# Patient Record
Sex: MALE | Race: WHITE | Employment: OTHER | URBAN - METROPOLITAN AREA
[De-identification: names, ages, dates, MRNs, and addresses within clinical notes are randomized per-mention and may not be internally consistent; named-entity substitution may affect disease eponyms.]

---

## 2017-03-08 ENCOUNTER — HOSPITAL ENCOUNTER (OUTPATIENT)
Dept: CT IMAGING | Age: 67
Discharge: HOME | End: 2017-03-08
Attending: INTERNAL MEDICINE
Payer: MEDICARE

## 2017-03-08 DIAGNOSIS — R13.10 PROBLEMS WITH SWALLOWING AND MASTICATION: ICD-10-CM

## 2017-03-08 DIAGNOSIS — K44.9 DIAPHRAGMATIC HERNIA WITHOUT MENTION OF OBSTRUCTION OR GANGRENE: ICD-10-CM

## 2017-03-08 DIAGNOSIS — R07.9 CHEST PAIN, UNSPECIFIED: ICD-10-CM

## 2017-03-08 DIAGNOSIS — K21.9 ESOPHAGEAL REFLUX: ICD-10-CM

## 2017-03-08 DIAGNOSIS — K90.0 CELIAC DISEASE: ICD-10-CM

## 2017-03-08 LAB — CREAT BLD-MCNC: 1 MG/DL (ref 0.6–1.3)

## 2017-03-08 PROCEDURE — 74011250636 HC RX REV CODE- 250/636: Performed by: INTERNAL MEDICINE

## 2017-03-08 PROCEDURE — 82565 ASSAY OF CREATININE: CPT

## 2017-03-08 PROCEDURE — 74011636320 HC RX REV CODE- 636/320: Performed by: INTERNAL MEDICINE

## 2017-03-08 PROCEDURE — 71260 CT THORAX DX C+: CPT

## 2017-03-08 RX ORDER — SODIUM CHLORIDE 0.9 % (FLUSH) 0.9 %
10 SYRINGE (ML) INJECTION
Status: COMPLETED | OUTPATIENT
Start: 2017-03-08 | End: 2017-03-08

## 2017-03-08 RX ORDER — SODIUM CHLORIDE 9 MG/ML
50 INJECTION, SOLUTION INTRAVENOUS
Status: COMPLETED | OUTPATIENT
Start: 2017-03-08 | End: 2017-03-08

## 2017-03-08 RX ADMIN — IOPAMIDOL 80 ML: 612 INJECTION, SOLUTION INTRAVENOUS at 07:49

## 2017-03-08 RX ADMIN — SODIUM CHLORIDE 50 ML/HR: 900 INJECTION, SOLUTION INTRAVENOUS at 07:50

## 2017-03-08 RX ADMIN — Medication 10 ML: at 07:50

## 2017-03-23 ENCOUNTER — OFFICE VISIT (OUTPATIENT)
Dept: CARDIOLOGY CLINIC | Age: 67
End: 2017-03-23

## 2017-03-23 VITALS
WEIGHT: 226 LBS | SYSTOLIC BLOOD PRESSURE: 130 MMHG | BODY MASS INDEX: 33.47 KG/M2 | HEIGHT: 69 IN | DIASTOLIC BLOOD PRESSURE: 90 MMHG | OXYGEN SATURATION: 95 % | RESPIRATION RATE: 16 BRPM | HEART RATE: 80 BPM

## 2017-03-23 DIAGNOSIS — I10 ESSENTIAL HYPERTENSION: Primary | ICD-10-CM

## 2017-03-23 DIAGNOSIS — I20.9 ANGINA PECTORIS (HCC): ICD-10-CM

## 2017-03-23 NOTE — MR AVS SNAPSHOT
Visit Information Date & Time Provider Department Dept. Phone Encounter #  
 3/23/2017 12:45 PM Shyam Brown, [address] [facility] [phone] Your Appointments 3/29/2017 10:00 AM  
ECHO CARDIOGRAMS 2D with [address] [facility]) Appt Note: echo per Dr. Renee Al, $15.00 cc, jr 3/23/17  
 [address]  
[phone] [address] [place] [address] [phone] [id number]  
  
    
 3/31/2017  9:00 AM  
STRESS ECHOCARDIOGRAMS with STRES[facility], [facility]) Appt Note: per Dr. Renee Al, $15.00 cc, jr 3/23/17  
 [address]  
[phone] [address] Upcoming Health Maintenance Date Due Hepatitis C Screening 1950 DTaP/Tdap/Td series (1 - Tdap) 7/25/1971 FOBT Q 1 YEAR AGE 50-75 7/25/2000 ZOSTER VACCINE AGE 60> 7/25/2010 GLAUCOMA SCREENING Q2Y 7/25/2015 Pneumococcal 65+ Low/Medium Risk (1 of 2 - PCV13) 7/25/2015 MEDICARE YEARLY EXAM 7/25/2015 INFLUENZA AGE 9 TO ADULT 8/1/2016 Allergies as of 3/23/2017  Review Complete On: 3/23/2017 By: Shyam Brown MD  
  
 Severity Noted Reaction Type Reactions Other Food  03/23/2017    Other (comments) Rye due to celiac Barley  03/23/2017    Other (comments) Celiac Wheat  03/23/2017    Other (comments) Celiac Current Immunizations  Never Reviewed No immunizations on file. Not reviewed this visit You Were Diagnosed With   
  
 Codes Comments Essential hypertension    -  Primary ICD-10-CM: I10 
ICD-9-CM: 401.9 Angina pectoris ([facility] [address]     ICD-10-CM: I20.9 ICD-9-CM: 413.9 Vitals  BP Pulse Resp Height(growth percentile) Weight(growth percentile) SpO2  
 130/90 (BP 1 Location: Right arm, BP Patient Position: Sitting) 80 16 5' 9\" (1.753 m) 226 lb (102.5 kg) 95% BMI Smoking Status 33.37 kg/m2 Never Smoker Vitals History BMI and BSA Data Body Mass Index Body Surface Area  
 33.37 kg/m 2 2.23 m 2 Preferred Pharmacy Pharmacy Name Phone [facility] [phone] Your Updated Medication List  
  
   
This list is accurate as of: 3/23/17 12:58 PM.  Always use your most recent med list.  
  
  
  
  
 ACIPHEX 20 mg tablet Generic drug:  RABEprazole Take 20 mg by mouth every morning. ADVAIR DISKUS 250-50 mcg/dose diskus inhaler Generic drug:  fluticasone-salmeterol Take 1 Puff by inhalation daily. losartan 50 mg tablet Commonly known as:  COZAAR Take 50 mg by mouth every morning. mometasone 50 mcg/actuation nasal spray Commonly known as:  NASONEX  
2 Sprays by Both Nostrils route daily. multivitamin tablet Commonly known as:  ONE A DAY Take 1 Tab by mouth daily. NORVASC 5 mg tablet Generic drug:  amLODIPine Take 5 mg by mouth every morning. Indications: HYPERTENSION  
  
 oxyCODONE IR 5 mg immediate release tablet Commonly known as:  [place]ridge Ditto Take 1-2 Tabs by mouth every three (3) hours as needed. Max Daily Amount: 80 mg. SALINE MIST NA  
by Nasal route daily. This is a medi pot usage  
  
 warfarin 2 mg tablet Commonly known as:  COUMADIN Begin on 6/16 at 6pm as instructed by Dr. Younger' office. Your INR will be drawn on Mondays and Thursdays. Your dose may need to be adjusted and direction will be given to you each Tuesday and Friday by Dr. Younger' office. ZANTAC PO Take  by mouth. We Performed the Following AMB POC EKG ROUTINE W/ 12 LEADS, INTER & REP [23552 CPT(R)] To-Do List   
 03/23/2017 ECHO:  2D ECHO COMPLETE ADULT (TTE) W OR WO CONTR   
  
 03/23/2017   ECHO:  ECHO TTE STRESS EXRCSE COMP W OR WO CONTR   
  
  
 Introducing [facility]! Dear Ovidio Matthews: Thank you for requesting a [facility] account. Our records indicate that you already have an active [employer] account. You can access your account anytime at [email or web address] Did you know that you can access your hospital and ER discharge instructions at any time in [employer]? You can also review all of your test results from your hospital stay or ER visit. Additional Information If you have questions, please visit the Frequently Asked Questions section of the [employer] website at [email or web address] Remember, [employer] is NOT to be used for urgent needs. For medical emergencies, dial 911. Now available from your iPhone and Android! Please provide this summary of care documentation to your next provider. Your primary care clinician is listed as Reid Alba. If you have any questions after today's visit, please call [phone].

## 2017-03-23 NOTE — PROGRESS NOTES
3/23/2017 3:22 PM      Subjective:     Azalia Sousa is here for further evaluation of substernal chest pressure and burning. Referred by GI. He has been c/o these symptoms for last few weeks. He had similar episode last year. No previous cardiac evaluation. Visit Vitals    /90 (BP 1 Location: Right arm, BP Patient Position: Sitting)    Pulse 80    Resp 16    Ht 5' 9\" (1.753 m)    Wt 226 lb (102.5 kg)    SpO2 95%    BMI 33.37 kg/m2     Current Outpatient Prescriptions   Medication Sig    RANITIDINE HCL (ZANTAC PO) Take  by mouth.  mometasone (NASONEX) 50 mcg/actuation nasal spray 2 Sprays by Both Nostrils route daily.  RABEprazole (ACIPHEX) 20 mg tablet Take 20 mg by mouth every morning.  multivitamin (ONE A DAY) tablet Take 1 Tab by mouth daily.  losartan (COZAAR) 50 mg tablet Take 50 mg by mouth every morning.  fluticasone-salmeterol (ADVAIR DISKUS) 250-50 mcg/dose diskus inhaler Take 1 Puff by inhalation daily.  amlodipine (NORVASC) 5 mg tablet Take 5 mg by mouth every morning. Indications: HYPERTENSION    oxyCODONE IR (ROXICODONE) 5 mg immediate release tablet Take 1-2 Tabs by mouth every three (3) hours as needed. Max Daily Amount: 80 mg.  warfarin (COUMADIN) 2 mg tablet Begin on 6/16 at 6pm as instructed by Dr. Younger' office. Your INR will be drawn on Mondays and Thursdays. Your dose may need to be adjusted and direction will be given to you each Tuesday and Friday by Dr. Younger' office.  SODIUM CHLORIDE (SALINE MIST NA) by Nasal route daily. This is a medi pot usage     No current facility-administered medications for this visit.           Objective:      Visit Vitals    /90 (BP 1 Location: Right arm, BP Patient Position: Sitting)    Pulse 80    Resp 16    Ht 5' 9\" (1.753 m)    Wt 226 lb (102.5 kg)    SpO2 95%    BMI 33.37 kg/m2       Data Review:     EKG: Normal sinus rhythm, no acute st/t changes    Reviewed and/or ordered active problem list, medication list tests    Past Medical History:   Diagnosis Date    Arthritis     right knee    Autoimmune disease ([facility] [address]     Celiac disease    Gastrointestinal disorder     acid reflux    GERD (gastroesophageal reflux disease)     Hiatal hernia     Hypertension     Ill-defined condition     hiatal hernia    Ill-defined condition     ruptured petalla    Other ill-defined conditions(799.89)     celiac disease    Other unknown and unspecified cause of morbidity or mortality     nasal polyps      Past Surgical History:   Procedure Laterality Date    HX ADENOIDECTOMY      HX HEENT      removal septum    HX ORTHOPAEDIC      right side patella surgery    HX OTHER SURGICAL      EGD/colonoscopy    HX OTHER SURGICAL      dislocation of left foot non surgical realignment     HX TONSILLECTOMY      ONC DX ESOPHAGEAL UNKNOWN      cyst removed    REVISION OF UNSTABLE PATELLA      right knee ruptured patella     Allergies   Allergen Reactions    Other Food Other (comments)     Rye due to celiac    Barley Other (comments)     Celiac    Wheat Other (comments)     Celiac      Family History   Problem Relation Age of Onset    Heart Disease Mother     Heart Disease Brother     Hypertension Paternal Uncle       Social History     Social History    Marital status:      Spouse name: N/A    Number of children: N/A    Years of education: N/A     Occupational History    Not on file. Social History Main Topics    Smoking status: Never Smoker    Smokeless tobacco: Former User    Alcohol use No    Drug use: Yes     Special: Prescription, OTC    Sexual activity: Not on file     Other Topics Concern    Not on file     Social History Narrative       Review of Systems     General: Not Present- Anorexia, Chills, Dietary Changes, Fatigue, Fever, Medication Changes, Night Sweats, Weight Gain > 10lbs. and Weight Loss > 10lbs. .  Skin: Not Present- Bruising and Excessive Sweating. HEENT: Not Present- Headache, Visual Loss and Vertigo. Respiratory: Not Present- Cough, Decreased Exercise Tolerance, Difficulty Breathing, Snoring and Wheezing. Cardiovascular: Not Present- Claudications, Difficulty Breathing On Exertion, Edema, Fainting / Blacking Out, Irregular Heart Beat, Night Cramps, Orthopnea, Palpitations, Paroxysmal Nocturnal Dyspnea, Rapid Heart Rate, Shortness of Breath and Swelling of Extremities. Gastrointestinal: Not Present- Black, Tarry Stool, Bloody Stool, Diarrhea, Hematemesis, Rectal Bleeding and Vomiting. Musculoskeletal: Not Present- Muscle Pain and Muscle Weakness. Neurological: Not Present- Dizziness. Psychiatric: Not Present- Depression. Endocrine: Not Present- Cold Intolerance, Heat Intolerance and Thyroid Problems. Hematology: Not Present- Abnormal Bleeding, Anemia, Blood Clots and Easy Bruising.       Physical Exam   The physical exam findings are as follows:       General   Mental Status - Alert. General Appearance - Cooperative and Well groomed. Not in acute distress. Orientation - Oriented to time, Oriented to place and Oriented to person. Build & Nutrition - Well developed. HEENT  Head - Normal.  Eye - Normal.      Neck   Carotid Arteries - normal upstroke. No Bruits. Chest and Lung Exam   Inspection:   Chest Wall: - Normal. Accessory muscles - No use of accessory muscles in breathing. Auscultation:   Breath sounds: - Normal.      Cardiovascular   Inspection: Jugular vein - Bilateral - Inspection Normal.  Palpation/Percussion:   Apical Impulse: - Normal.  Auscultation: Rhythm - Regular. Heart Sounds - S1 WNL and S2 WNL. No S3 or S4. Murmurs & Other Heart Sounds: Auscultation of the heart reveals - No Murmurs. Abdomen   Palpation/Percussion: Palpation and Percussion of the abdomen reveal - No Palpable abdominal masses.   Auscultation: Auscultation of the abdomen reveals - Bowel sounds normal.      Peripheral Vascular   Upper Extremity: Inspection - Bilateral - No Cyanotic nailbeds or Digital clubbing. Palpation: Radial pulse - Bilateral - Normal.  Lower Extremity:   Palpation: Femoral pulse - Bilateral - Normal. Dorsalis pedis pulse - Bilateral - Normal. Posterior tibia pulse - Bilateral - Normal. Edema - Bilateral - No edema. Auscultation - No Bilateral Groin Bruits. Neurologic   Mental Status: Affect - normal.  Motor: - Normal. Gait - Normal.        Assessment:       ICD-10-CM ICD-9-CM    1. Essential hypertension I10 401.9 AMB POC EKG ROUTINE W/ 12 LEADS, INTER & REP      2D ECHO COMPLETE ADULT (TTE) W OR WO CONTR      ECHO TTE STRESS EXRCSE COMP W OR WO CONTR   2. Angina pectoris (HCC) I20.9 413.9 2D ECHO COMPLETE ADULT (TTE) W OR WO CONTR      ECHO TTE STRESS EXRCSE COMP W OR WO CONTR       Plan:     1. Angina: will check stress Echo and Echo. Further recommendations based upon test results. 2. HTN: continue current meds.

## 2017-03-29 ENCOUNTER — CLINICAL SUPPORT (OUTPATIENT)
Dept: CARDIOLOGY CLINIC | Age: 67
End: 2017-03-29

## 2017-03-29 DIAGNOSIS — I10 ESSENTIAL HYPERTENSION: Primary | ICD-10-CM

## 2017-03-31 ENCOUNTER — TELEPHONE (OUTPATIENT)
Dept: CARDIOLOGY CLINIC | Age: 67
End: 2017-03-31

## 2017-03-31 ENCOUNTER — CLINICAL SUPPORT (OUTPATIENT)
Dept: CARDIOLOGY CLINIC | Age: 67
End: 2017-03-31

## 2017-03-31 DIAGNOSIS — R07.9 CHEST PAIN, UNSPECIFIED: Primary | ICD-10-CM

## 2017-03-31 NOTE — TELEPHONE ENCOUNTER
----- Message from Trey Glez MD sent at 3/31/2017  8:02 AM EDT -----  Inform him Echo is ok      Called pt,verified pt with two pt identifiers,told pt his echo is okay. He verbalized that he understood everything.

## 2017-04-04 ENCOUNTER — TELEPHONE (OUTPATIENT)
Dept: CARDIOLOGY CLINIC | Age: 67
End: 2017-04-04

## 2017-04-04 NOTE — TELEPHONE ENCOUNTER
----- Message from Roger Vogt MD sent at 4/3/2017  6:47 PM EDT -----  Inform him stress Echo is ok      Left message to call me back. Left 2nd message to call me back. NOTED. PT WAS TOLD THAT TEST RESULTS ARE OKAY.

## 2018-04-30 ENCOUNTER — HOSPITAL ENCOUNTER (OUTPATIENT)
Dept: MRI IMAGING | Age: 68
Discharge: HOME | End: 2018-04-30
Attending: ORTHOPAEDIC SURGERY
Payer: MEDICARE

## 2018-04-30 DIAGNOSIS — M54.41 ACUTE BACK PAIN WITH SCIATICA, RIGHT: ICD-10-CM

## 2018-04-30 DIAGNOSIS — M54.42 ACUTE BACK PAIN WITH SCIATICA, LEFT: ICD-10-CM

## 2018-04-30 PROCEDURE — 72148 MRI LUMBAR SPINE W/O DYE: CPT

## 2018-07-11 ENCOUNTER — HOSPITAL ENCOUNTER (OUTPATIENT)
Dept: GENERAL RADIOLOGY | Age: 68
Discharge: HOME | End: 2018-07-11
Attending: ORTHOPAEDIC SURGERY
Payer: MEDICARE

## 2018-07-11 ENCOUNTER — HOSPITAL ENCOUNTER (OUTPATIENT)
Dept: PREADMISSION TESTING | Age: 68
Discharge: HOME | End: 2018-07-11
Attending: ORTHOPAEDIC SURGERY
Payer: MEDICARE

## 2018-07-11 VITALS
HEART RATE: 66 BPM | OXYGEN SATURATION: 97 % | WEIGHT: 236.99 LBS | HEIGHT: 69 IN | SYSTOLIC BLOOD PRESSURE: 154 MMHG | DIASTOLIC BLOOD PRESSURE: 80 MMHG | BODY MASS INDEX: 35.1 KG/M2 | TEMPERATURE: 98.7 F | RESPIRATION RATE: 18 BRPM

## 2018-07-11 LAB
25(OH)D3 SERPL-MCNC: 23.5 NG/ML (ref 30–100)
ABO + RH BLD: NORMAL
ALBUMIN SERPL-MCNC: 3.7 G/DL (ref 3.5–5)
ALBUMIN/GLOB SERPL: 1.2 {RATIO} (ref 1.1–2.2)
ALP SERPL-CCNC: 69 U/L (ref 45–117)
ALT SERPL-CCNC: 77 U/L (ref 12–78)
ANION GAP SERPL CALC-SCNC: 5 MMOL/L (ref 5–15)
APPEARANCE UR: CLEAR
AST SERPL-CCNC: 39 U/L (ref 15–37)
BACTERIA URNS QL MICRO: NEGATIVE /HPF
BILIRUB SERPL-MCNC: 0.7 MG/DL (ref 0.2–1)
BILIRUB UR QL CFM: NEGATIVE
BLOOD GROUP ANTIBODIES SERPL: NORMAL
BUN SERPL-MCNC: 10 MG/DL (ref 6–20)
BUN/CREAT SERPL: 9 (ref 12–20)
CALCIUM SERPL-MCNC: 9.1 MG/DL (ref 8.5–10.1)
CHLORIDE SERPL-SCNC: 109 MMOL/L (ref 97–108)
CO2 SERPL-SCNC: 27 MMOL/L (ref 21–32)
COLOR UR: NORMAL
CREAT SERPL-MCNC: 1.13 MG/DL (ref 0.7–1.3)
EPITH CASTS URNS QL MICRO: NORMAL /LPF
ERYTHROCYTE [DISTWIDTH] IN BLOOD BY AUTOMATED COUNT: 14 % (ref 11.5–14.5)
EST. AVERAGE GLUCOSE BLD GHB EST-MCNC: 117 MG/DL
GLOBULIN SER CALC-MCNC: 3.1 G/DL (ref 2–4)
GLUCOSE SERPL-MCNC: 123 MG/DL (ref 65–100)
GLUCOSE UR STRIP.AUTO-MCNC: NEGATIVE MG/DL
HBA1C MFR BLD: 5.7 % (ref 4.2–6.3)
HCT VFR BLD AUTO: 45.4 % (ref 36.6–50.3)
HGB BLD-MCNC: 15.4 G/DL (ref 12.1–17)
HGB UR QL STRIP: NEGATIVE
HYALINE CASTS URNS QL MICRO: NORMAL /LPF (ref 0–5)
INR PPP: 1 (ref 0.9–1.1)
KETONES UR QL STRIP.AUTO: NEGATIVE MG/DL
LEUKOCYTE ESTERASE UR QL STRIP.AUTO: NEGATIVE
MCH RBC QN AUTO: 30.9 PG (ref 26–34)
MCHC RBC AUTO-ENTMCNC: 33.9 G/DL (ref 30–36.5)
MCV RBC AUTO: 91 FL (ref 80–99)
NITRITE UR QL STRIP.AUTO: NEGATIVE
NRBC # BLD: 0 K/UL (ref 0–0.01)
NRBC BLD-RTO: 0 PER 100 WBC
PH UR STRIP: 7 [PH] (ref 5–8)
PLATELET # BLD AUTO: 183 K/UL (ref 150–400)
PMV BLD AUTO: 10.8 FL (ref 8.9–12.9)
POTASSIUM SERPL-SCNC: 4.1 MMOL/L (ref 3.5–5.1)
PREALB SERPL-MCNC: 27.4 MG/DL (ref 20–40)
PROT SERPL-MCNC: 6.8 G/DL (ref 6.4–8.2)
PROT UR STRIP-MCNC: NEGATIVE MG/DL
PROTHROMBIN TIME: 10.5 SEC (ref 9–11.1)
RBC # BLD AUTO: 4.99 M/UL (ref 4.1–5.7)
RBC #/AREA URNS HPF: NORMAL /HPF (ref 0–5)
SODIUM SERPL-SCNC: 141 MMOL/L (ref 136–145)
SP GR UR REFRACTOMETRY: 1 (ref 1–1.03)
SPECIMEN EXP DATE BLD: NORMAL
UA: UC IF INDICATED,UAUC: NORMAL
UROBILINOGEN UR QL STRIP.AUTO: 0.2 EU/DL (ref 0.2–1)
WBC # BLD AUTO: 8.5 K/UL (ref 4.1–11.1)
WBC URNS QL MICRO: NORMAL /HPF (ref 0–4)

## 2018-07-11 PROCEDURE — 85027 COMPLETE CBC AUTOMATED: CPT | Performed by: ORTHOPAEDIC SURGERY

## 2018-07-11 PROCEDURE — 81001 URINALYSIS AUTO W/SCOPE: CPT | Performed by: ORTHOPAEDIC SURGERY

## 2018-07-11 PROCEDURE — 93005 ELECTROCARDIOGRAM TRACING: CPT

## 2018-07-11 PROCEDURE — 84134 ASSAY OF PREALBUMIN: CPT | Performed by: ORTHOPAEDIC SURGERY

## 2018-07-11 PROCEDURE — 80053 COMPREHEN METABOLIC PANEL: CPT | Performed by: ORTHOPAEDIC SURGERY

## 2018-07-11 PROCEDURE — 36415 COLL VENOUS BLD VENIPUNCTURE: CPT | Performed by: ORTHOPAEDIC SURGERY

## 2018-07-11 PROCEDURE — 71046 X-RAY EXAM CHEST 2 VIEWS: CPT

## 2018-07-11 PROCEDURE — 83036 HEMOGLOBIN GLYCOSYLATED A1C: CPT | Performed by: ORTHOPAEDIC SURGERY

## 2018-07-11 PROCEDURE — 86900 BLOOD TYPING SEROLOGIC ABO: CPT | Performed by: ORTHOPAEDIC SURGERY

## 2018-07-11 PROCEDURE — 82306 VITAMIN D 25 HYDROXY: CPT | Performed by: ORTHOPAEDIC SURGERY

## 2018-07-11 PROCEDURE — 85610 PROTHROMBIN TIME: CPT | Performed by: ORTHOPAEDIC SURGERY

## 2018-07-11 RX ORDER — ACETAMINOPHEN 10 MG/ML
1000 INJECTION, SOLUTION INTRAVENOUS ONCE
Status: CANCELLED | OUTPATIENT
Start: 2018-07-23 | End: 2018-07-23

## 2018-07-11 RX ORDER — GABAPENTIN 300 MG/1
300 CAPSULE ORAL 3 TIMES DAILY
COMMUNITY

## 2018-07-11 RX ORDER — PREGABALIN 150 MG/1
150 CAPSULE ORAL ONCE
Status: CANCELLED | OUTPATIENT
Start: 2018-07-23 | End: 2018-07-23

## 2018-07-11 RX ORDER — SODIUM CHLORIDE, SODIUM LACTATE, POTASSIUM CHLORIDE, CALCIUM CHLORIDE 600; 310; 30; 20 MG/100ML; MG/100ML; MG/100ML; MG/100ML
25 INJECTION, SOLUTION INTRAVENOUS CONTINUOUS
Status: CANCELLED | OUTPATIENT
Start: 2018-07-23

## 2018-07-11 RX ORDER — ETODOLAC 400 MG/1
400 TABLET, FILM COATED ORAL 2 TIMES DAILY WITH MEALS
COMMUNITY

## 2018-07-11 RX ORDER — TIZANIDINE HYDROCHLORIDE 4 MG/1
4 CAPSULE, GELATIN COATED ORAL
COMMUNITY
End: 2018-07-25

## 2018-07-11 RX ORDER — CEFAZOLIN SODIUM 1 G/3ML
2 INJECTION, POWDER, FOR SOLUTION INTRAMUSCULAR; INTRAVENOUS ONCE
Status: CANCELLED | OUTPATIENT
Start: 2018-07-23 | End: 2018-07-23

## 2018-07-11 NOTE — PERIOP NOTES
Called blood bank because T & S wasn't extended for time. They will look at info and adjust date accordingly.

## 2018-07-11 NOTE — ADVANCED PRACTICE NURSE
BART 4 in PAT assessment. Pt denies snoring loud enough to be heard through a closed door, ever having been advised that he has pauses in breathing while sleeping or ever having been referred for a sleep apnea evaluation. Denies prior complications from anesthesia. Denies decreased cervical ROM. Denies loose teeth but reports partial lower plates.

## 2018-07-12 LAB
ATRIAL RATE: 66 BPM
BACTERIA SPEC CULT: NORMAL
BACTERIA SPEC CULT: NORMAL
CALCULATED P AXIS, ECG09: 27 DEGREES
CALCULATED R AXIS, ECG10: 5 DEGREES
CALCULATED T AXIS, ECG11: 17 DEGREES
DIAGNOSIS, 93000: NORMAL
P-R INTERVAL, ECG05: 140 MS
Q-T INTERVAL, ECG07: 398 MS
QRS DURATION, ECG06: 100 MS
QTC CALCULATION (BEZET), ECG08: 417 MS
SERVICE CMNT-IMP: NORMAL
VENTRICULAR RATE, ECG03: 66 BPM

## 2018-07-12 RX ORDER — CHOLECALCIFEROL (VITAMIN D3) 125 MCG
CAPSULE ORAL DAILY
COMMUNITY
End: 2018-07-25 | Stop reason: DRUGHIGH

## 2018-07-12 NOTE — ADVANCED PRACTICE NURSE
PC to pt regarding low vitamin D level and recommendations by Dr. Kelly Beckett to take OTC vitamin D 2000 iu daily. Instructions provided to pt's wife who states she will relay information to pt and have him start today.

## 2018-07-23 ENCOUNTER — APPOINTMENT (OUTPATIENT)
Dept: GENERAL RADIOLOGY | Age: 68
DRG: 455 | End: 2018-07-23
Attending: ORTHOPAEDIC SURGERY
Payer: MEDICARE

## 2018-07-23 ENCOUNTER — ANESTHESIA (OUTPATIENT)
Dept: SURGERY | Age: 68
DRG: 455 | End: 2018-07-23
Payer: MEDICARE

## 2018-07-23 ENCOUNTER — ANESTHESIA EVENT (OUTPATIENT)
Dept: SURGERY | Age: 68
DRG: 455 | End: 2018-07-23
Payer: MEDICARE

## 2018-07-23 ENCOUNTER — HOSPITAL ENCOUNTER (INPATIENT)
Age: 68
LOS: 1 days | Discharge: HOME | DRG: 455 | End: 2018-07-24
Attending: ORTHOPAEDIC SURGERY | Admitting: ORTHOPAEDIC SURGERY
Payer: MEDICARE

## 2018-07-23 DIAGNOSIS — Z98.1 S/P LUMBAR SPINAL FUSION: Primary | ICD-10-CM

## 2018-07-23 PROBLEM — M48.062 SPINAL STENOSIS OF LUMBAR REGION WITH NEUROGENIC CLAUDICATION: Status: ACTIVE | Noted: 2018-07-23

## 2018-07-23 PROBLEM — M43.10 SPONDYLISTHESIS: Status: ACTIVE | Noted: 2018-07-23

## 2018-07-23 PROCEDURE — 77030034475 HC MISC IMPL SPN: Performed by: ORTHOPAEDIC SURGERY

## 2018-07-23 PROCEDURE — 77030034849: Performed by: ORTHOPAEDIC SURGERY

## 2018-07-23 PROCEDURE — 77030039267 HC ADH SKN EXOFIN S2SG -B: Performed by: ORTHOPAEDIC SURGERY

## 2018-07-23 PROCEDURE — 77030002986 HC SUT PROL J&J -A: Performed by: ORTHOPAEDIC SURGERY

## 2018-07-23 PROCEDURE — 77030030943 HC ST DIL NIMS STIM MEDT -G: Performed by: ORTHOPAEDIC SURGERY

## 2018-07-23 PROCEDURE — 74011000250 HC RX REV CODE- 250

## 2018-07-23 PROCEDURE — 76010000173 HC OR TIME 3 TO 3.5 HR INTENSV-TIER 1: Performed by: ORTHOPAEDIC SURGERY

## 2018-07-23 PROCEDURE — C1713 ANCHOR/SCREW BN/BN,TIS/BN: HCPCS | Performed by: ORTHOPAEDIC SURGERY

## 2018-07-23 PROCEDURE — 77030012961 HC IRR KT CYSTO/TUR ICUM -A: Performed by: ORTHOPAEDIC SURGERY

## 2018-07-23 PROCEDURE — 77030029099 HC BN WAX SSPC -A: Performed by: ORTHOPAEDIC SURGERY

## 2018-07-23 PROCEDURE — 74011250637 HC RX REV CODE- 250/637: Performed by: ORTHOPAEDIC SURGERY

## 2018-07-23 PROCEDURE — 74011250636 HC RX REV CODE- 250/636

## 2018-07-23 PROCEDURE — 77030010514 HC APPL CLP LIG COVD -B: Performed by: ORTHOPAEDIC SURGERY

## 2018-07-23 PROCEDURE — 77030032490 HC SLV COMPR SCD KNE COVD -B: Performed by: ORTHOPAEDIC SURGERY

## 2018-07-23 PROCEDURE — 77030019908 HC STETH ESOPH SIMS -A: Performed by: NURSE ANESTHETIST, CERTIFIED REGISTERED

## 2018-07-23 PROCEDURE — 77030018836 HC SOL IRR NACL ICUM -A: Performed by: ORTHOPAEDIC SURGERY

## 2018-07-23 PROCEDURE — 77030036946 HC GRFT BN FBR CORT 3DEMIN 10CC BACT -G: Performed by: ORTHOPAEDIC SURGERY

## 2018-07-23 PROCEDURE — 77030013079 HC BLNKT BAIR HGGR 3M -A: Performed by: NURSE ANESTHETIST, CERTIFIED REGISTERED

## 2018-07-23 PROCEDURE — 77030012407 HC DRN WND BARD -B: Performed by: ORTHOPAEDIC SURGERY

## 2018-07-23 PROCEDURE — 0SG00A0 FUSION OF LUMBAR VERTEBRAL JOINT WITH INTERBODY FUSION DEVICE, ANTERIOR APPROACH, ANTERIOR COLUMN, OPEN APPROACH: ICD-10-PCS | Performed by: ORTHOPAEDIC SURGERY

## 2018-07-23 PROCEDURE — 77030022704 HC SUT VLOC COVD -B: Performed by: ORTHOPAEDIC SURGERY

## 2018-07-23 PROCEDURE — 77030033138 HC SUT PGA STRATFX J&J -B: Performed by: ORTHOPAEDIC SURGERY

## 2018-07-23 PROCEDURE — 77030025953 HC GRFT BN CUBE CNFRM MUSC -C: Performed by: ORTHOPAEDIC SURGERY

## 2018-07-23 PROCEDURE — 77030008684 HC TU ET CUF COVD -B: Performed by: NURSE ANESTHETIST, CERTIFIED REGISTERED

## 2018-07-23 PROCEDURE — 76210000006 HC OR PH I REC 0.5 TO 1 HR: Performed by: ORTHOPAEDIC SURGERY

## 2018-07-23 PROCEDURE — 77030020782 HC GWN BAIR PAWS FLX 3M -B

## 2018-07-23 PROCEDURE — 76001 XR FLUOROSCOPY OVER 60 MINUTES: CPT

## 2018-07-23 PROCEDURE — 74011000250 HC RX REV CODE- 250: Performed by: ORTHOPAEDIC SURGERY

## 2018-07-23 PROCEDURE — 77030038844 HC GRFT DMB NEVOS BIOE -G1: Performed by: ORTHOPAEDIC SURGERY

## 2018-07-23 PROCEDURE — 74011250636 HC RX REV CODE- 250/636: Performed by: ORTHOPAEDIC SURGERY

## 2018-07-23 PROCEDURE — 77030013567 HC DRN WND RESERV BARD -A: Performed by: ORTHOPAEDIC SURGERY

## 2018-07-23 PROCEDURE — 77030002933 HC SUT MCRYL J&J -A: Performed by: ORTHOPAEDIC SURGERY

## 2018-07-23 PROCEDURE — 07DR3ZZ EXTRACTION OF ILIAC BONE MARROW, PERCUTANEOUS APPROACH: ICD-10-PCS | Performed by: ORTHOPAEDIC SURGERY

## 2018-07-23 PROCEDURE — 0SG0071 FUSION OF LUMBAR VERTEBRAL JOINT WITH AUTOLOGOUS TISSUE SUBSTITUTE, POSTERIOR APPROACH, POSTERIOR COLUMN, OPEN APPROACH: ICD-10-PCS | Performed by: ORTHOPAEDIC SURGERY

## 2018-07-23 PROCEDURE — 76060000037 HC ANESTHESIA 3 TO 3.5 HR: Performed by: ORTHOPAEDIC SURGERY

## 2018-07-23 PROCEDURE — 77030008839: Performed by: ORTHOPAEDIC SURGERY

## 2018-07-23 PROCEDURE — 0ST20ZZ RESECTION OF LUMBAR VERTEBRAL DISC, OPEN APPROACH: ICD-10-PCS | Performed by: ORTHOPAEDIC SURGERY

## 2018-07-23 PROCEDURE — 72100 X-RAY EXAM L-S SPINE 2/3 VWS: CPT

## 2018-07-23 PROCEDURE — 74011250636 HC RX REV CODE- 250/636: Performed by: ANESTHESIOLOGY

## 2018-07-23 PROCEDURE — 77030004391 HC BUR FLUT MEDT -C: Performed by: ORTHOPAEDIC SURGERY

## 2018-07-23 PROCEDURE — 77030018719 HC DRSG PTCH ANTIMIC J&J -A: Performed by: ORTHOPAEDIC SURGERY

## 2018-07-23 PROCEDURE — 77030014647 HC SEAL FBRN TISSL BAXT -D: Performed by: ORTHOPAEDIC SURGERY

## 2018-07-23 PROCEDURE — 77030026438 HC STYL ET INTUB CARD -A: Performed by: NURSE ANESTHETIST, CERTIFIED REGISTERED

## 2018-07-23 PROCEDURE — 77030035129: Performed by: ORTHOPAEDIC SURGERY

## 2018-07-23 PROCEDURE — 77030020268 HC MISC GENERAL SUPPLY: Performed by: ORTHOPAEDIC SURGERY

## 2018-07-23 PROCEDURE — 77030003666 HC NDL SPINAL BD -A: Performed by: ORTHOPAEDIC SURGERY

## 2018-07-23 PROCEDURE — 77030003028 HC SUT VCRL J&J -A: Performed by: ORTHOPAEDIC SURGERY

## 2018-07-23 PROCEDURE — 77030039325 HC SPCR SPN PIVOX MEDT -I3: Performed by: ORTHOPAEDIC SURGERY

## 2018-07-23 PROCEDURE — 77030003029 HC SUT VCRL J&J -B: Performed by: ORTHOPAEDIC SURGERY

## 2018-07-23 PROCEDURE — 74011000272 HC RX REV CODE- 272: Performed by: ORTHOPAEDIC SURGERY

## 2018-07-23 PROCEDURE — 77030028539 HC KNF DSCTMY MTRX BYN MEDT -D: Performed by: ORTHOPAEDIC SURGERY

## 2018-07-23 PROCEDURE — 77030018846 HC SOL IRR STRL H20 ICUM -A: Performed by: ORTHOPAEDIC SURGERY

## 2018-07-23 PROCEDURE — 77030014007 HC SPNG HEMSTAT J&J -B: Performed by: ORTHOPAEDIC SURGERY

## 2018-07-23 PROCEDURE — 77030011266 HC ELECTRD BLD INSL COVD -A: Performed by: ORTHOPAEDIC SURGERY

## 2018-07-23 PROCEDURE — 77030018673: Performed by: ORTHOPAEDIC SURGERY

## 2018-07-23 PROCEDURE — 77030008467 HC STPLR SKN COVD -B: Performed by: ORTHOPAEDIC SURGERY

## 2018-07-23 PROCEDURE — 65270000029 HC RM PRIVATE

## 2018-07-23 DEVICE — SPACER 2112560 OLIF25 20MM 12 DEG 16X60
Type: IMPLANTABLE DEVICE | Site: SPINE LUMBAR | Status: FUNCTIONAL
Brand: PIVOX™ OBLIQUE LATERAL SPINAL SYSTEM

## 2018-07-23 DEVICE — SET SCR SPNL TI SGL INNR FOR VIPER 2 MINIMALLY INVASIVE: Type: IMPLANTABLE DEVICE | Site: SPINE LUMBAR | Status: FUNCTIONAL

## 2018-07-23 DEVICE — ALLOGRAFT BNE SPNG 50X20X7 MM CANC DBM: Type: IMPLANTABLE DEVICE | Site: SPINE LUMBAR | Status: FUNCTIONAL

## 2018-07-23 DEVICE — IMPLANTABLE DEVICE: Type: IMPLANTABLE DEVICE | Site: SPINE LUMBAR | Status: FUNCTIONAL

## 2018-07-23 RX ORDER — LIDOCAINE HYDROCHLORIDE 10 MG/ML
0.1 INJECTION, SOLUTION EPIDURAL; INFILTRATION; INTRACAUDAL; PERINEURAL AS NEEDED
Status: DISCONTINUED | OUTPATIENT
Start: 2018-07-23 | End: 2018-07-23 | Stop reason: HOSPADM

## 2018-07-23 RX ORDER — HYDROMORPHONE HYDROCHLORIDE 2 MG/ML
1 INJECTION, SOLUTION INTRAMUSCULAR; INTRAVENOUS; SUBCUTANEOUS
Status: DISCONTINUED | OUTPATIENT
Start: 2018-07-23 | End: 2018-07-24

## 2018-07-23 RX ORDER — SODIUM CHLORIDE 0.9 % (FLUSH) 0.9 %
5-10 SYRINGE (ML) INJECTION AS NEEDED
Status: DISCONTINUED | OUTPATIENT
Start: 2018-07-23 | End: 2018-07-23 | Stop reason: HOSPADM

## 2018-07-23 RX ORDER — EPHEDRINE SULFATE 50 MG/ML
INJECTION, SOLUTION INTRAVENOUS AS NEEDED
Status: DISCONTINUED | OUTPATIENT
Start: 2018-07-23 | End: 2018-07-23 | Stop reason: HOSPADM

## 2018-07-23 RX ORDER — SODIUM CHLORIDE 9 MG/ML
125 INJECTION, SOLUTION INTRAVENOUS CONTINUOUS
Status: DISCONTINUED | OUTPATIENT
Start: 2018-07-23 | End: 2018-07-24 | Stop reason: HOSPADM

## 2018-07-23 RX ORDER — PHENYLEPHRINE HCL IN 0.9% NACL 0.4MG/10ML
SYRINGE (ML) INTRAVENOUS AS NEEDED
Status: DISCONTINUED | OUTPATIENT
Start: 2018-07-23 | End: 2018-07-23 | Stop reason: HOSPADM

## 2018-07-23 RX ORDER — HYDROMORPHONE HYDROCHLORIDE 2 MG/ML
INJECTION, SOLUTION INTRAMUSCULAR; INTRAVENOUS; SUBCUTANEOUS AS NEEDED
Status: DISCONTINUED | OUTPATIENT
Start: 2018-07-23 | End: 2018-07-23 | Stop reason: HOSPADM

## 2018-07-23 RX ORDER — MELATONIN
2000 DAILY
Status: DISCONTINUED | OUTPATIENT
Start: 2018-07-24 | End: 2018-07-24 | Stop reason: HOSPADM

## 2018-07-23 RX ORDER — CYCLOBENZAPRINE HCL 10 MG
10 TABLET ORAL
Status: DISCONTINUED | OUTPATIENT
Start: 2018-07-23 | End: 2018-07-24 | Stop reason: HOSPADM

## 2018-07-23 RX ORDER — CEFAZOLIN SODIUM 1 G/3ML
2 INJECTION, POWDER, FOR SOLUTION INTRAMUSCULAR; INTRAVENOUS ONCE
Status: DISCONTINUED | OUTPATIENT
Start: 2018-07-23 | End: 2018-07-23 | Stop reason: SDUPTHER

## 2018-07-23 RX ORDER — GABAPENTIN 100 MG/1
100 CAPSULE ORAL 3 TIMES DAILY
Status: DISCONTINUED | OUTPATIENT
Start: 2018-07-23 | End: 2018-07-23

## 2018-07-23 RX ORDER — SODIUM CHLORIDE 0.9 % (FLUSH) 0.9 %
5-10 SYRINGE (ML) INJECTION EVERY 8 HOURS
Status: DISCONTINUED | OUTPATIENT
Start: 2018-07-24 | End: 2018-07-24 | Stop reason: HOSPADM

## 2018-07-23 RX ORDER — CEFAZOLIN SODIUM/WATER 2 G/20 ML
2 SYRINGE (ML) INTRAVENOUS ONCE
Status: COMPLETED | OUTPATIENT
Start: 2018-07-23 | End: 2018-07-23

## 2018-07-23 RX ORDER — PANTOPRAZOLE SODIUM 40 MG/1
40 TABLET, DELAYED RELEASE ORAL DAILY
Status: DISCONTINUED | OUTPATIENT
Start: 2018-07-24 | End: 2018-07-24 | Stop reason: HOSPADM

## 2018-07-23 RX ORDER — DIAZEPAM 5 MG/1
5 TABLET ORAL
Status: DISCONTINUED | OUTPATIENT
Start: 2018-07-23 | End: 2018-07-24 | Stop reason: HOSPADM

## 2018-07-23 RX ORDER — ONDANSETRON 2 MG/ML
INJECTION INTRAMUSCULAR; INTRAVENOUS AS NEEDED
Status: DISCONTINUED | OUTPATIENT
Start: 2018-07-23 | End: 2018-07-23 | Stop reason: HOSPADM

## 2018-07-23 RX ORDER — DEXMEDETOMIDINE HYDROCHLORIDE 4 UG/ML
INJECTION, SOLUTION INTRAVENOUS
Status: DISCONTINUED | OUTPATIENT
Start: 2018-07-23 | End: 2018-07-23 | Stop reason: HOSPADM

## 2018-07-23 RX ORDER — SODIUM CHLORIDE 0.9 % (FLUSH) 0.9 %
5-10 SYRINGE (ML) INJECTION EVERY 8 HOURS
Status: DISCONTINUED | OUTPATIENT
Start: 2018-07-23 | End: 2018-07-23 | Stop reason: HOSPADM

## 2018-07-23 RX ORDER — HYDROMORPHONE HYDROCHLORIDE 1 MG/ML
0.2 INJECTION, SOLUTION INTRAMUSCULAR; INTRAVENOUS; SUBCUTANEOUS
Status: DISCONTINUED | OUTPATIENT
Start: 2018-07-23 | End: 2018-07-23 | Stop reason: HOSPADM

## 2018-07-23 RX ORDER — DEXAMETHASONE SODIUM PHOSPHATE 4 MG/ML
10 INJECTION, SOLUTION INTRA-ARTICULAR; INTRALESIONAL; INTRAMUSCULAR; INTRAVENOUS; SOFT TISSUE ONCE
Status: COMPLETED | OUTPATIENT
Start: 2018-07-24 | End: 2018-07-24

## 2018-07-23 RX ORDER — GABAPENTIN 300 MG/1
300 CAPSULE ORAL 3 TIMES DAILY
Status: DISCONTINUED | OUTPATIENT
Start: 2018-07-23 | End: 2018-07-24 | Stop reason: HOSPADM

## 2018-07-23 RX ORDER — LOSARTAN POTASSIUM 50 MG/1
50 TABLET ORAL
Status: DISCONTINUED | OUTPATIENT
Start: 2018-07-24 | End: 2018-07-24 | Stop reason: HOSPADM

## 2018-07-23 RX ORDER — MIDAZOLAM HYDROCHLORIDE 1 MG/ML
INJECTION, SOLUTION INTRAMUSCULAR; INTRAVENOUS AS NEEDED
Status: DISCONTINUED | OUTPATIENT
Start: 2018-07-23 | End: 2018-07-23 | Stop reason: HOSPADM

## 2018-07-23 RX ORDER — NEOSTIGMINE METHYLSULFATE 1 MG/ML
INJECTION INTRAVENOUS AS NEEDED
Status: DISCONTINUED | OUTPATIENT
Start: 2018-07-23 | End: 2018-07-23 | Stop reason: HOSPADM

## 2018-07-23 RX ORDER — CEFAZOLIN SODIUM/WATER 2 G/20 ML
2 SYRINGE (ML) INTRAVENOUS EVERY 8 HOURS
Status: COMPLETED | OUTPATIENT
Start: 2018-07-24 | End: 2018-07-24

## 2018-07-23 RX ORDER — FENTANYL CITRATE 50 UG/ML
25 INJECTION, SOLUTION INTRAMUSCULAR; INTRAVENOUS
Status: DISCONTINUED | OUTPATIENT
Start: 2018-07-23 | End: 2018-07-23 | Stop reason: HOSPADM

## 2018-07-23 RX ORDER — GLYCOPYRROLATE 0.2 MG/ML
INJECTION INTRAMUSCULAR; INTRAVENOUS AS NEEDED
Status: DISCONTINUED | OUTPATIENT
Start: 2018-07-23 | End: 2018-07-23 | Stop reason: HOSPADM

## 2018-07-23 RX ORDER — METFORMIN HYDROCHLORIDE 1000 MG/1
1500 TABLET ORAL DAILY
COMMUNITY
End: 2018-07-25

## 2018-07-23 RX ORDER — ROCURONIUM BROMIDE 10 MG/ML
INJECTION, SOLUTION INTRAVENOUS AS NEEDED
Status: DISCONTINUED | OUTPATIENT
Start: 2018-07-23 | End: 2018-07-23 | Stop reason: HOSPADM

## 2018-07-23 RX ORDER — SODIUM CHLORIDE, SODIUM LACTATE, POTASSIUM CHLORIDE, CALCIUM CHLORIDE 600; 310; 30; 20 MG/100ML; MG/100ML; MG/100ML; MG/100ML
25 INJECTION, SOLUTION INTRAVENOUS CONTINUOUS
Status: DISCONTINUED | OUTPATIENT
Start: 2018-07-23 | End: 2018-07-23 | Stop reason: HOSPADM

## 2018-07-23 RX ORDER — FAMOTIDINE 20 MG/1
20 TABLET, FILM COATED ORAL 2 TIMES DAILY
Status: DISCONTINUED | OUTPATIENT
Start: 2018-07-23 | End: 2018-07-24 | Stop reason: HOSPADM

## 2018-07-23 RX ORDER — HYDROXYZINE HYDROCHLORIDE 10 MG/1
10 TABLET, FILM COATED ORAL
Status: DISCONTINUED | OUTPATIENT
Start: 2018-07-23 | End: 2018-07-24 | Stop reason: HOSPADM

## 2018-07-23 RX ORDER — PROPOFOL 10 MG/ML
INJECTION, EMULSION INTRAVENOUS AS NEEDED
Status: DISCONTINUED | OUTPATIENT
Start: 2018-07-23 | End: 2018-07-23 | Stop reason: HOSPADM

## 2018-07-23 RX ORDER — TIZANIDINE 4 MG/1
4 TABLET ORAL
Status: DISCONTINUED | OUTPATIENT
Start: 2018-07-23 | End: 2018-07-24 | Stop reason: HOSPADM

## 2018-07-23 RX ORDER — METFORMIN HYDROCHLORIDE 500 MG/1
1500 TABLET, EXTENDED RELEASE ORAL DAILY
Status: DISCONTINUED | OUTPATIENT
Start: 2018-07-24 | End: 2018-07-24 | Stop reason: HOSPADM

## 2018-07-23 RX ORDER — SODIUM CHLORIDE 0.9 % (FLUSH) 0.9 %
5-10 SYRINGE (ML) INJECTION AS NEEDED
Status: DISCONTINUED | OUTPATIENT
Start: 2018-07-23 | End: 2018-07-24 | Stop reason: HOSPADM

## 2018-07-23 RX ORDER — PREGABALIN 75 MG/1
150 CAPSULE ORAL ONCE
Status: COMPLETED | OUTPATIENT
Start: 2018-07-23 | End: 2018-07-23

## 2018-07-23 RX ORDER — SUCCINYLCHOLINE CHLORIDE 20 MG/ML
INJECTION INTRAMUSCULAR; INTRAVENOUS AS NEEDED
Status: DISCONTINUED | OUTPATIENT
Start: 2018-07-23 | End: 2018-07-23 | Stop reason: HOSPADM

## 2018-07-23 RX ORDER — ONDANSETRON 2 MG/ML
4 INJECTION INTRAMUSCULAR; INTRAVENOUS
Status: DISCONTINUED | OUTPATIENT
Start: 2018-07-23 | End: 2018-07-24 | Stop reason: HOSPADM

## 2018-07-23 RX ORDER — FENTANYL CITRATE 50 UG/ML
INJECTION, SOLUTION INTRAMUSCULAR; INTRAVENOUS AS NEEDED
Status: DISCONTINUED | OUTPATIENT
Start: 2018-07-23 | End: 2018-07-23 | Stop reason: HOSPADM

## 2018-07-23 RX ORDER — FLUTICASONE PROPIONATE 50 MCG
1 SPRAY, SUSPENSION (ML) NASAL DAILY
Status: DISCONTINUED | OUTPATIENT
Start: 2018-07-24 | End: 2018-07-24 | Stop reason: HOSPADM

## 2018-07-23 RX ORDER — OXYCODONE HYDROCHLORIDE 5 MG/1
5 TABLET ORAL
Status: DISCONTINUED | OUTPATIENT
Start: 2018-07-23 | End: 2018-07-24 | Stop reason: HOSPADM

## 2018-07-23 RX ORDER — NALOXONE HYDROCHLORIDE 0.4 MG/ML
0.4 INJECTION, SOLUTION INTRAMUSCULAR; INTRAVENOUS; SUBCUTANEOUS AS NEEDED
Status: DISCONTINUED | OUTPATIENT
Start: 2018-07-23 | End: 2018-07-24 | Stop reason: HOSPADM

## 2018-07-23 RX ORDER — DEXAMETHASONE SODIUM PHOSPHATE 4 MG/ML
INJECTION, SOLUTION INTRA-ARTICULAR; INTRALESIONAL; INTRAMUSCULAR; INTRAVENOUS; SOFT TISSUE AS NEEDED
Status: DISCONTINUED | OUTPATIENT
Start: 2018-07-23 | End: 2018-07-23 | Stop reason: HOSPADM

## 2018-07-23 RX ORDER — AMOXICILLIN 250 MG
1 CAPSULE ORAL 2 TIMES DAILY
Status: DISCONTINUED | OUTPATIENT
Start: 2018-07-23 | End: 2018-07-24 | Stop reason: HOSPADM

## 2018-07-23 RX ORDER — ACETAMINOPHEN 500 MG
1000 TABLET ORAL EVERY 6 HOURS
Status: DISCONTINUED | OUTPATIENT
Start: 2018-07-23 | End: 2018-07-24 | Stop reason: HOSPADM

## 2018-07-23 RX ORDER — OXYCODONE HYDROCHLORIDE 5 MG/1
10-15 TABLET ORAL
Status: DISCONTINUED | OUTPATIENT
Start: 2018-07-23 | End: 2018-07-24 | Stop reason: HOSPADM

## 2018-07-23 RX ORDER — VECURONIUM BROMIDE FOR INJECTION 1 MG/ML
INJECTION, POWDER, LYOPHILIZED, FOR SOLUTION INTRAVENOUS AS NEEDED
Status: DISCONTINUED | OUTPATIENT
Start: 2018-07-23 | End: 2018-07-23 | Stop reason: HOSPADM

## 2018-07-23 RX ORDER — FACIAL-BODY WIPES
10 EACH TOPICAL DAILY PRN
Status: DISCONTINUED | OUTPATIENT
Start: 2018-07-25 | End: 2018-07-24 | Stop reason: HOSPADM

## 2018-07-23 RX ORDER — POLYETHYLENE GLYCOL 3350 17 G/17G
17 POWDER, FOR SOLUTION ORAL DAILY
Status: DISCONTINUED | OUTPATIENT
Start: 2018-07-24 | End: 2018-07-24 | Stop reason: HOSPADM

## 2018-07-23 RX ORDER — SODIUM CHLORIDE, SODIUM LACTATE, POTASSIUM CHLORIDE, CALCIUM CHLORIDE 600; 310; 30; 20 MG/100ML; MG/100ML; MG/100ML; MG/100ML
25 INJECTION, SOLUTION INTRAVENOUS CONTINUOUS
Status: DISCONTINUED | OUTPATIENT
Start: 2018-07-23 | End: 2018-07-23

## 2018-07-23 RX ORDER — AMLODIPINE BESYLATE 5 MG/1
5 TABLET ORAL
Status: DISCONTINUED | OUTPATIENT
Start: 2018-07-24 | End: 2018-07-24

## 2018-07-23 RX ORDER — DIPHENHYDRAMINE HYDROCHLORIDE 50 MG/ML
12.5 INJECTION, SOLUTION INTRAMUSCULAR; INTRAVENOUS AS NEEDED
Status: DISCONTINUED | OUTPATIENT
Start: 2018-07-23 | End: 2018-07-23 | Stop reason: HOSPADM

## 2018-07-23 RX ORDER — LIDOCAINE HYDROCHLORIDE 20 MG/ML
INJECTION, SOLUTION EPIDURAL; INFILTRATION; INTRACAUDAL; PERINEURAL AS NEEDED
Status: DISCONTINUED | OUTPATIENT
Start: 2018-07-23 | End: 2018-07-23 | Stop reason: HOSPADM

## 2018-07-23 RX ORDER — THERA TABS 400 MCG
1 TAB ORAL DAILY
Status: DISCONTINUED | OUTPATIENT
Start: 2018-07-24 | End: 2018-07-24 | Stop reason: HOSPADM

## 2018-07-23 RX ORDER — ACETAMINOPHEN 10 MG/ML
1000 INJECTION, SOLUTION INTRAVENOUS ONCE
Status: COMPLETED | OUTPATIENT
Start: 2018-07-23 | End: 2018-07-23

## 2018-07-23 RX ADMIN — MIDAZOLAM HYDROCHLORIDE 2 MG: 1 INJECTION, SOLUTION INTRAMUSCULAR; INTRAVENOUS at 16:34

## 2018-07-23 RX ADMIN — NEOSTIGMINE METHYLSULFATE 5 MG: 1 INJECTION INTRAVENOUS at 19:36

## 2018-07-23 RX ADMIN — Medication 80 MCG: at 19:08

## 2018-07-23 RX ADMIN — GLYCOPYRROLATE 0.2 MG: 0.2 INJECTION INTRAMUSCULAR; INTRAVENOUS at 18:43

## 2018-07-23 RX ADMIN — VECURONIUM BROMIDE FOR INJECTION 3 MG: 1 INJECTION, POWDER, LYOPHILIZED, FOR SOLUTION INTRAVENOUS at 17:22

## 2018-07-23 RX ADMIN — ACETAMINOPHEN 1000 MG: 500 TABLET ORAL at 22:03

## 2018-07-23 RX ADMIN — SUCCINYLCHOLINE CHLORIDE 140 MG: 20 INJECTION INTRAMUSCULAR; INTRAVENOUS at 16:43

## 2018-07-23 RX ADMIN — ONDANSETRON 4 MG: 2 INJECTION INTRAMUSCULAR; INTRAVENOUS at 19:08

## 2018-07-23 RX ADMIN — PROPOFOL 200 MG: 10 INJECTION, EMULSION INTRAVENOUS at 16:43

## 2018-07-23 RX ADMIN — SODIUM CHLORIDE 125 ML/HR: 900 INJECTION, SOLUTION INTRAVENOUS at 20:05

## 2018-07-23 RX ADMIN — Medication 80 MCG: at 18:33

## 2018-07-23 RX ADMIN — GLYCOPYRROLATE 0.7 MG: 0.2 INJECTION INTRAMUSCULAR; INTRAVENOUS at 19:36

## 2018-07-23 RX ADMIN — ROCURONIUM BROMIDE 5 MG: 10 INJECTION, SOLUTION INTRAVENOUS at 16:43

## 2018-07-23 RX ADMIN — FENTANYL CITRATE 100 MCG: 50 INJECTION, SOLUTION INTRAMUSCULAR; INTRAVENOUS at 16:43

## 2018-07-23 RX ADMIN — Medication 2 G: at 16:52

## 2018-07-23 RX ADMIN — HYDROMORPHONE HYDROCHLORIDE 1 MG: 2 INJECTION, SOLUTION INTRAMUSCULAR; INTRAVENOUS; SUBCUTANEOUS at 19:42

## 2018-07-23 RX ADMIN — FENTANYL CITRATE 50 MCG: 50 INJECTION, SOLUTION INTRAMUSCULAR; INTRAVENOUS at 19:39

## 2018-07-23 RX ADMIN — ROCURONIUM BROMIDE 45 MG: 10 INJECTION, SOLUTION INTRAVENOUS at 16:51

## 2018-07-23 RX ADMIN — PREGABALIN 150 MG: 75 CAPSULE ORAL at 15:24

## 2018-07-23 RX ADMIN — FENTANYL CITRATE 50 MCG: 50 INJECTION, SOLUTION INTRAMUSCULAR; INTRAVENOUS at 18:40

## 2018-07-23 RX ADMIN — LIDOCAINE HYDROCHLORIDE 80 MG: 20 INJECTION, SOLUTION EPIDURAL; INFILTRATION; INTRACAUDAL; PERINEURAL at 16:43

## 2018-07-23 RX ADMIN — DEXMEDETOMIDINE HYDROCHLORIDE 0.3 MCG/KG/HR: 4 INJECTION, SOLUTION INTRAVENOUS at 17:21

## 2018-07-23 RX ADMIN — VECURONIUM BROMIDE FOR INJECTION 2 MG: 1 INJECTION, POWDER, LYOPHILIZED, FOR SOLUTION INTRAVENOUS at 18:32

## 2018-07-23 RX ADMIN — GABAPENTIN 300 MG: 300 CAPSULE ORAL at 22:00

## 2018-07-23 RX ADMIN — Medication 80 MCG: at 18:30

## 2018-07-23 RX ADMIN — OXYCODONE HYDROCHLORIDE 5 MG: 5 TABLET ORAL at 20:29

## 2018-07-23 RX ADMIN — SODIUM CHLORIDE 5 MG: 9 INJECTION INTRAMUSCULAR; INTRAVENOUS; SUBCUTANEOUS at 20:34

## 2018-07-23 RX ADMIN — PROPOFOL 100 MG: 10 INJECTION, EMULSION INTRAVENOUS at 16:46

## 2018-07-23 RX ADMIN — DIAZEPAM 5 MG: 5 TABLET ORAL at 20:29

## 2018-07-23 RX ADMIN — SODIUM CHLORIDE, SODIUM LACTATE, POTASSIUM CHLORIDE, AND CALCIUM CHLORIDE 25 ML/HR: 600; 310; 30; 20 INJECTION, SOLUTION INTRAVENOUS at 15:20

## 2018-07-23 RX ADMIN — FENTANYL CITRATE 50 MCG: 50 INJECTION, SOLUTION INTRAMUSCULAR; INTRAVENOUS at 17:20

## 2018-07-23 RX ADMIN — FENTANYL CITRATE 50 MCG: 50 INJECTION, SOLUTION INTRAMUSCULAR; INTRAVENOUS at 17:18

## 2018-07-23 RX ADMIN — EPHEDRINE SULFATE 5 MG: 50 INJECTION, SOLUTION INTRAVENOUS at 18:48

## 2018-07-23 RX ADMIN — HYDROMORPHONE HYDROCHLORIDE 0.5 MG: 2 INJECTION, SOLUTION INTRAMUSCULAR; INTRAVENOUS; SUBCUTANEOUS at 19:25

## 2018-07-23 RX ADMIN — ACETAMINOPHEN 1000 MG: 10 INJECTION, SOLUTION INTRAVENOUS at 15:20

## 2018-07-23 RX ADMIN — DOCUSATE SODIUM AND SENNOSIDES 1 TABLET: 8.6; 5 TABLET, FILM COATED ORAL at 22:02

## 2018-07-23 RX ADMIN — SODIUM CHLORIDE, SODIUM LACTATE, POTASSIUM CHLORIDE, AND CALCIUM CHLORIDE: 600; 310; 30; 20 INJECTION, SOLUTION INTRAVENOUS at 18:39

## 2018-07-23 RX ADMIN — DEXAMETHASONE SODIUM PHOSPHATE 8 MG: 4 INJECTION, SOLUTION INTRA-ARTICULAR; INTRALESIONAL; INTRAMUSCULAR; INTRAVENOUS; SOFT TISSUE at 16:55

## 2018-07-23 RX ADMIN — FENTANYL CITRATE 50 MCG: 50 INJECTION, SOLUTION INTRAMUSCULAR; INTRAVENOUS at 19:46

## 2018-07-23 RX ADMIN — HYDROMORPHONE HYDROCHLORIDE 0.5 MG: 2 INJECTION, SOLUTION INTRAMUSCULAR; INTRAVENOUS; SUBCUTANEOUS at 19:32

## 2018-07-23 RX ADMIN — HYDROMORPHONE HYDROCHLORIDE 1 MG: 2 INJECTION, SOLUTION INTRAMUSCULAR; INTRAVENOUS; SUBCUTANEOUS at 23:30

## 2018-07-23 RX ADMIN — FAMOTIDINE 20 MG: 20 TABLET ORAL at 22:02

## 2018-07-23 RX ADMIN — Medication 120 MCG: at 18:31

## 2018-07-23 NOTE — PERIOP NOTES
Dr. Joel Ast aware of bp readings  184/99 right arm   repeat 164/92 right arm and 162/93 left arm . No orders given.

## 2018-07-23 NOTE — OP NOTES
[address]  OPERATIVE REPORT    Natasha Tesfaye  MR#: [id number]  : 1950  ACCOUNT #: [de-identified]   DATE OF SERVICE: 2018    PREOPERATIVE DIAGNOSES:  1. L4-L5 spondylolisthesis. 2.  Spinal stenosis. 3.  Lumbago. 4.  Sciatica. POSTOPERATIVE DIAGNOSES:   1. L4-L5 spondylolisthesis. 2.  Spinal stenosis. 3.  Lumbago. 4.  Sciatica. PROCEDURES PERFORMED:  1. L4-L5 anterior fusion (OLIF). 2.  L4-L5 insertion of anterior biomechanical device. 3. L4-L5 anterior instrumentation. 4.  Use of allograft bone for spine fusion. 5.  Bone marrow aspirate from the left posterior superior iliac spine for spinal fusion augmentation. SURGEON:  Eileen Chua MD     FIRST ASSESSMENT:  Cathy Craig PA-C     ESTIMATED BLOOD LOSS:  25 mL. COMPLICATIONS:  None. SPECIMENS REMOVED:  None. ANESTHESIA:  General.    DRAINS:  None. IMPLANTS:  The Medtronic Pivox cage and the Medtronic Pivox plate. INDICATIONS FOR THE PROCEDURE:  The patient is a 49-year-old gentleman with lumbar spinal stenosis due to L4-L5 spondylolisthesis. He has failed to improve with nonoperative treatment and has at this point decided to proceed with surgical intervention. I have given him warnings about possible complications including, but not limited to pain, scar, bleeding, infection, nonunion, damage to surrounding structures, death, paralysis, blindness, stroke. He understands and wants to proceed. NARRATIVE OF THE PROCEDURE:  Informed consent was obtained and the operative site was properly marked. The patient was moved back to the operating room and underwent general endotracheal anesthesia. He was positioned in the lateral decubitus with the left side up using the [facility] hospital table flat top. A bump was placed under the hip and another one under the shoulder blade, axillary roll under the axilla, and then he was secured to the bed with 3-inch tape.   Once affixed to the bed, we proceeded to obtain AP and lateral x-rays and verified that we had the correct level marked for surgery. We then proceeded to prep and drape in the usual manner. A timeout was obtained verifying that this was the correct patient, the correct surgery, the correct site, as well as that he had received IV antibiotics within 30 minutes of the incision. I then proceeded to perform my standard anterolateral approach (OLIF) at the L4-L5 level. Once that incision was made, the abdominal musculature was split in line with the muscle fibers until we reached the retroperitoneal space. The peritoneal cavity was bluntly dissected and pushed anteriorly and the psoas muscle was found. Once the psoas muscle was found, I proceeded to gently retract this posteriorly and found the disk space deep to the psoas. A marker was placed and the retractor was brought into position, then the C-arm was brought into position to confirm that we were in the correct level. Once that was completed, I placed a self-retaining retractor then used a 15 blade to perform a box annulotomy, the annulus was removed with a pituitary and the Waggoner was then used to detach the superior and inferior cartilaginous endplate, and then a box shaver was used to remove the disk. Once the disk was completely removed, we used trials to verify that we had the correct size and once we were finished with the trials and had selected the size of an implant, I used a pituitary to remove all the disk debris, made sure that I had left behind punctate bleeding bone. Once satisfied with the disk preparation, I proceeded then to aspirate 60 mL of bone marrow from the left posterior superior iliac spine using a Jamshidi needle through a small stab incision and proceeded to soak the bone graft, which was in this case an MTF Conform Cube and an Amendia Nevos sponge.   Both of those were soaked in bone marrow, placed in the cage, the cage was then inserted going from apophyseal ring to apophyseal ring, reducing the slight scoliosis he had at that level and the spondylolisthesis. Once the cage was in place, I selected the plate and drilled for 30 mm screws at L4 and L5, and used the final tightening device to final tighten it. Once that was completed, I proceeded to verify final AP and lateral x-rays, saved them to PACS, closed the abdominal musculature in layers with #1 Vicryl running sutures, followed by closing the subcutaneous with 2-0 Vicryl and the skin with a 3-0 running Monocryl. Dermabond was applied, and the patient was then placed in the prone position for the second part of the procedure, which will be dictated separately.       MD ZULLY Delgadillo / JAHAIRA  D: 07/23/2018 18:04     T: 07/23/2018 19:29  JOB #: [id number]  CC: Bisi Rousseau MD

## 2018-07-23 NOTE — ANESTHESIA PREPROCEDURE EVALUATION
Anesthetic History   No history of anesthetic complications            Review of Systems / Medical History  Patient summary reviewed, nursing notes reviewed and pertinent labs reviewed    Pulmonary  Within defined limits                 Neuro/Psych   Within defined limits           Cardiovascular    Hypertension              Exercise tolerance: >4 METS     GI/Hepatic/Renal     GERD      Hiatal hernia     Endo/Other        Obesity and arthritis     Other Findings   Comments: Lumbago          Physical Exam    Airway  Mallampati: II  TM Distance: 4 - 6 cm  Neck ROM: normal range of motion   Mouth opening: Normal     Cardiovascular  Regular rate and rhythm,  S1 and S2 normal,  no murmur, click, rub, or gallop             Dental    Dentition: Lower partial plate     Pulmonary  Breath sounds clear to auscultation               Abdominal  GI exam deferred       Other Findings            Anesthetic Plan    ASA: 2  Anesthesia type: general    Monitoring Plan: BIS      Induction: Intravenous  Anesthetic plan and risks discussed with: Patient

## 2018-07-23 NOTE — BRIEF OP NOTE
BRIEF OPERATIVE NOTE    Date of Procedure: 7/23/2018   Preoperative Diagnosis: LUMBAGO, RADICULOPATHYC STENOSIS, SPONDYLOLISTHESIS  Postoperative Diagnosis: LUMBAGO, RADICULOPATHYC STENOSIS, SPONDYLOLISTHESIS    Procedure(s):  L4-L5 POSTERIOR FUSION (germán munguia)  Surgeon(s) and Role:     * Natan Jackson MD - Primary         Surgical Assistant: Luke Miguel    Surgical Staff:  Circ-1: Balbir Lowe  Circ-Relief: Ladarius Sarmiento RN  Circ-Intern: José Miguel Maldonado RN  Physician Assistant: Man Villela, [address] [facility] Gino  Radiology Technician: RT Alverto  Scrub Tech-1: Carlie Downing  Event Time In   Incision Start 1720   Incision Close      Anesthesia: General   Estimated Blood Loss: 50  Specimens: * No specimens in log *   Findings: Spondylolisthesis  Complications: none  Implants:   Implant Name Type Inv.  Item Serial No.  Lot No. LRB No. Used Action   GRAFT FIBERS BNE GUNNAR 10CC -- 3DEMIN - [id number]  GRAFT FIBERS BNE GUNNAR 10CC -- 3DEMIN [id number] N/A N/A 1 Implanted   GRAFT SPNG DMB CANC [id number] -- NEVOS - [id number]  GRAFT SPNG DMB CANC [id number] -- NEVOS [id number] N/A N/A 1 Implanted   conform cube 17mm q-pack   [id number] DEPUY SPINE 8611 N/A 1 Implanted   PLATE SPNE LAT LUMBAR 2H SM -- PIVOX - SN/A  PLATE SPNE LAT LUMBAR 2H SM -- PIVOX N/A MEDTRONIC SOFAMOR DANEK [id number] N/A 1 Implanted   SPACER 20MM 12 DEG [id number] -- PIVOX - SN/A  SPACER 20MM 12 DEG [id number] -- PIVOX N/A [employer] SOFAMOR DANEK [id number] N/A 1 Implanted   SCR BNE LUMBAR 5.5X30MM -- PIVOX - SN/A  SCR BNE LUMBAR 5.5X30MM -- PIVOX N/A [employer]TRON[employer] SOFAMOR DANEK [id number] N/A 2 Implanted   7X45MM VIPER PRIME SCREW   N/A DEPUY SPINE N/A N/A 4 Implanted   SCR ST SPNE INNER VENKATA VIPER --  - SN/A  SCR ST SPNE INNER VENKATA VIPER --  N/A [facility] DEPUY SPINE N/A N/A 4 Implanted   45MM VIPER GOPI     N/A DEPUY SPINE N/A N/A 2 Implanted

## 2018-07-23 NOTE — ROUTINE PROCESS
Patient: Stephanie Chandler MRN: [id number]  SSN: [id number]   YOB: 1950  Age: 79 y.o. Sex: male     Patient is status post Procedure(s):  L4-L5 LATERAL POSTERIOR DECOMPRESSION AND FUSION (dunlap sol). Surgeon(s) and Role:     * Tyrone Richmond MD - Primary    Local/Dose/Irrigation:  100mL dunlap solution                         Airway - Endotracheal Tube 07/23/18 Oral (Active)   Line Lee Lips 7/23/2018 12:00 AM                   Dressing/Packing:  Wound Flank Left-DRESSING TYPE: Topical skin adhesive/glue; Adhesive wound dressing (Band-Aid) (07/23/18 1911)  Wound Back-DRESSING TYPE: Topical skin adhesive/glue (07/23/18 1911)  Splint/Cast:  ]

## 2018-07-23 NOTE — BRIEF OP NOTE
BRIEF OPERATIVE NOTE    Date of Procedure: 7/23/2018   Preoperative Diagnosis: LUMBAGO, RADICULOPATHYC STENOSIS, SPONDYLOLISTHESIS  Postoperative Diagnosis: LUMBAGO, RADICULOPATHYC STENOSIS, SPONDYLOLISTHESIS    Procedure(s):  L4-L5 LATERAL FUSION (germán munguia)  Surgeon(s) and Role:     * Aminta Mcintosh MD - Primary         Surgical Assistant: Luke Mancini    Surgical Staff:  Circ-1: Genny Espinoza  Circ-Relief: Demetrice Casper RN  Circ-Intern: Felicia Simpson RN  Physician Assistant: Tobi Alberto [place]  Radiology Technician: RT Angelica  Scrub Tech-1: Corrinne Ducking  Event Time In   Incision Start 1720   Incision Close      Anesthesia: General   Estimated Blood Loss: 25cc   Specimens: * No specimens in log *   Findings: Stenosis   Complications: none  Implants:   Implant Name Type Inv.  Item Serial No.  Lot No. LRB No. Used Action   GRAFT FIBERS BNE GUNNAR 10CC -- 3DEMIN - [id number]  GRAFT FIBERS BNE GUNNAR 10CC -- 3DEMIN [id number] [employer] N/A N/A 1 Implanted   GRAFT SPNG DMB CANC [id number] -- NEVOS - [id number]  GRAFT SPNG DMB CANC [id number] -- NEVOS [id number] [email or web address] N/A N/A 1 Implanted   conform cube 17mm q-pack   [id number] DEPUY SPINE 8611 N/A 1 Implanted   PLATE SPNE LAT LUMBAR 2H SM -- PIVOX - SN/A  PLATE SPNE LAT LUMBAR 2H SM -- PIVOX N/A [employer] SOFAMOR DANEK [id number] N/A 1 Implanted   SPACER 20MM 12 DEG [id number] -- PIVOX - SN/A  SPACER 20MM 12 DEG [id number] -- PIVOX N/A [employer] SOFAMOR DANEK [id number] N/A 1 Implanted   SCR BNE LUMBAR 5.5X30MM -- PIVOX - SN/A   SCR BNE LUMBAR 5.5X30MM -- PIVOX N/A [employer]TR[employer] [facility] [id number] N/A 2 Implanted

## 2018-07-23 NOTE — IP AVS SNAPSHOT
[facility] 
[phone] Patient: Yomi Parker MRN: [id number] KQE:[id number]/26/[id number] About your hospitalization You were admitted on:  July 23, 2018 You last received care in the:  [facility] 3 ORTHOPEDICS You were discharged on:  July 24, 2018 Why you were hospitalized Your primary diagnosis was:  Not on File Your diagnoses also included:  Spinal Stenosis Of Lumbar Region With Neurogenic Claudication, Spondylisthesis Follow-up Information Follow up With Details Comments Contact Info Kulwant Perry MD In 2 weeks  [facility] 
[phone] [address] [place] [address]  This is your [address]  [address] [facility] [address] 
[phone] Odette Hopson MD   [address] [facility] 
[phone] Your Scheduled Appointments Wednesday July 25, 2018 To Be Determined PT ADMISSION with Ernesto Fernandez [address] ([address] [place] [address]) [facility] [address] ([address] [place] [address]) Discharge Orders None A check josefa indicates which time of day the medication should be taken. My Medications START taking these medications Instructions Each Dose to Equal  
 Morning Noon Evening Bedtime  
 acetaminophen 500 mg tablet Commonly known as:  TYLENOL Your last dose was: Your next dose is: Take 2 Tabs by mouth every six (6) hours for 14 days. 1000 mg  
    
   
   
   
  
 ondansetron 4 mg disintegrating tablet Commonly known as:  ZOFRAN ODT Your last dose was: Your next dose is: Take 1 Tab by mouth every eight (8) hours as needed for Nausea. 4 mg  
    
   
   
   
  
 oxyCODONE IR 5 mg immediate release tablet Commonly known as:  Clary Norman Your last dose was: Your next dose is: Take 1-2 Tabs by mouth every four (4) hours as needed. Max Daily Amount: 60 mg. Take with food 5-10 mg  
    
   
   
   
  
 polyethylene glycol 17 gram packet Commonly known as:  Rajesh Rajput Your last dose was: Your next dose is: Take 1 Packet by mouth daily as needed (constipation) for up to 15 days. 17 g  
    
   
   
   
  
 senna-docusate 8.6-50 mg per tablet Commonly known as:  Caro Metcalf Your last dose was: Your next dose is: Take 1 Tab by mouth daily. 1 Tab CONTINUE taking these medications Instructions Each Dose to Equal  
 Morning Noon Evening Bedtime ACIPHEX 20 mg tablet Generic drug:  RABEprazole Your last dose was: Your next dose is: Take 20 mg by mouth every morning. 20 mg  
    
   
   
   
  
 etodolac 400 mg tablet Commonly known as:  LODINE Your last dose was: Your next dose is: Take 400 mg by mouth two (2) times daily (with meals). 400 mg  
    
   
   
   
  
 gabapentin 300 mg capsule Commonly known as:  NEURONTIN Your last dose was: Your next dose is: Take 300 mg by mouth three (3) times daily. 300 mg GLUCOPHAGE 1,000 mg tablet Generic drug:  metFORMIN Your last dose was: Your next dose is: Take 1,500 mg by mouth daily. 1500 mg  
    
   
   
   
  
 losartan 50 mg tablet Commonly known as:  COZAAR Your last dose was: Your next dose is: Take 50 mg by mouth every morning. 50 mg  
    
   
   
   
  
 mometasone 50 mcg/actuation nasal spray Commonly known as:  Con Chavez Your last dose was: Your next dose is: 2 Sprays by Both Nostrils route daily. 2 [place] multivitamin tablet Commonly known as:  ONE A DAY Your last dose was: Your next dose is: Take 1 Tab by mouth daily. 1 Tab NORVASC 5 mg tablet Generic drug:  amLODIPine Your last dose was: Your next dose is: Take 5 mg by mouth every morning. Indications: HYPERTENSION  
 5 mg SALINE MIST NA Your last dose was: Your next dose is:    
   
   
 by Nasal route as needed. This is a medi pot usage tiZANidine 4 mg capsule Commonly known as:  Giselle Seeds Your last dose was: Your next dose is: Take 4 mg by mouth every eight (8) hours as needed. 4 mg VITAMIN D3 2,000 unit Tab Generic drug:  cholecalciferol (vitamin D3) Your last dose was: Your next dose is: Take  by mouth daily. Where to Get Your Medications Information on where to get these meds will be given to you by the nurse or doctor. ! Ask your nurse or doctor about these medications  
  acetaminophen 500 mg tablet  
 ondansetron 4 mg disintegrating tablet  
 oxyCODONE IR 5 mg immediate release tablet  
 polyethylene glycol 17 gram packet  
 senna-docusate 8.6-50 mg per tablet Opioid Education Prescription Opioids: What You Need to Know: 
 
 
DR. Maria Isabel Amin Pain control: 
 Typically, we will prescribe a narcotic usually 1-2 tabs every four hours is sufficient for the pain. Most patients need this only for the first few weeks. You 
 should discontinue this as the pain decreases. You should not drive while taking any narcotic pain medications. Constipation Pain medicines and anesthesia can be constipating-this can be prevented by gentle physical activity and drinking plenty of fluid. It should be treated with over-the-counter medications such as Miralax or suppositories, and/or Fleets enema. You should have a bowel movement at least every other day following surgery. Incision care Keep this area clean and dry. Your dressing is designed to stay in place for 5-7 days. You will be sent home with one additional dressing to change at that time. Leave this new dressing in place until our follow up visit in the office in about 10-14 days. If staples are in place, they should be removed about 14-20 days after surgery. You may shower with this impermeable dressing in place. DO NOT take a tub bath or go swimming until cleared by your doctor. DO NOT apply lotions, oils, or creams to incision. To increase and promote healing: 
? Stop Smoking (or at least cut back on smoking). ? Eat a well-balanced diet (high in protein and vitamin C) ? If your appetite is poor, consider nutritional supplements like Ensure, Glucerna, or [place] Instant Breakfast. 
? If you are diabetic, controlling you blood sugars is very important to prevent infection and promote wound healing. Nutrition: ? If you were on a supplement such as Ensure or Glucerna) while in the hospital, please continue using them with each meal for the next 30 days. ? Eat a well-balanced diet - High in protein, high in vitamins and minerals, especially vitamin C and zinc.  
 
Restrictions: 
 Remember your \"BLT's\" 1. Limited bending at waist 
  2. Lift no more than 10 pounds 3. No Twisting If you were given a brace, wear it when out of bed. Warning signs : Please call your physician immediately at [phone] if you have ? Bleeding from incision that is constant. ? Change in mental status (unusual behavior or confusion) ? If your incision develops redness or swelling 
? Change in wound drainage (increase in amount, color, or foul odor) ? [place] over 101.5 degrees Fahrenheit  
? Headache that is not relieved with pain medication ? Tenderness or redness in the calf of your leg Emergency: CALL 911 if you have ? Shortness of breath ? Chest pain ? Localized chest pain when coughing or taking a deep breath Follow-up Please call Dr. Munoz Current office for a follow up appointment in 2 weeks at [phone]. You can return to work when cleared by a physician. During normal business hours you may reach Dr. Osman Mercer' team directly at [phone] if you have concerns or questions. Ari Mcnamara [employer] Announcement We are excited to announce that we are making your provider's discharge notes available to you in [employer]. You will see these notes when they are completed and signed by the physician that discharged you from your recent hospital stay. If you have any questions or concerns about any information you see in [employer], please call the Health Information Department where you were seen or reach out to your Primary Care Provider for more information about your plan of care. Introducing [facility]! Dear Zaria Ramos: Thank you for requesting a [employer] account. Our records indicate that you already have an active [employer] account. You can access your account anytime at [email or web address] Did you know that you can access your hospital and ER discharge instructions at any time in [employer]? You can also review all of your test results from your hospital stay or ER visit. Additional Information If you have questions, please visit the Frequently Asked Questions section of the [employer] website at [email or web address] Remember, [employer] is NOT to be used for urgent needs. For medical emergencies, dial 911. Now available from your iPhone and Android! Introducing Theodore Diallo As a Nadege Aquino patient, I wanted to make you aware of our electronic visit tool called Theodore Diallo. Nadege Reyna"[employer] 24/7 allows you to connect within minutes with a medical provider 24 hours a day, seven days a week via a mobile device or tablet or logging into a secure website from your computer. You can access Theodore Diallo from anywhere in the [place]. A virtual visit might be right for you when you have a simple condition and feel like you just dont want to get out of bed, or cant get away from work for an appointment, when your regular Nadege [facility] provider is not available (evenings, weekends or holidays), or when youre out of town and need minor care. Electronic visits cost only $49 and if the Nadegejudi Aquino [employer]/7 provider determines a prescription is needed to treat your condition, one can be electronically transmitted to a nearby pharmacy*. Please take a moment to enroll today if you have not already done so. The enrollment process is free and takes just a few minutes. To enroll, please download the Pix[employer] 24/7 yaneth to your tablet or phone, or visit [email or web address] to enroll on your computer. And, as an [address] [place] [address] patient with a [employer] account, the results of your visits will be scanned into your electronic medical record and your primary care provider will be able to view the scanned results. We urge you to continue to see your regular Nadege Ropes provider for your ongoing medical care. And while your primary care provider may not be the one available when you seek a Theodore Diallo virtual visit, the peace of mind you get from getting a real diagnosis real time can be priceless. For more information on Theodore Soteronimisha, view our Frequently Asked Questions (FAQs) at [email or web address]. Sincerely, 
 
Jesica Lozano MD 
Chief Medical Officer [address] *:  certain medications cannot be prescribed via Theodore Soteronimisha Unresulted Labs-Please follow up with your PCP about these lab tests Order Current Status XR SPINE LUMB 2 OR 3 V In process Providers Seen During Your Hospitalization Provider Specialty Primary office phone Case Nguyễn MD Orthopedic Surgery [phone] Your Primary Care Physician (PCP) Primary Care Physician Office Phone Office Fax Awais Ford [phone] You are allergic to the following Allergen Reactions Other Food Other (comments) Rye due to celiac Barley Other (comments) Celiac Wheat Other (comments) Celiac Recent Documentation Height Weight BMI Smoking Status 1.753 m 103.7 kg 33.76 kg/m2 Never Smoker Emergency Contacts Name Discharge Info Relation Home Work Mobile [facility] DISCHARGE CAREGIVER [3] Spouse [3] [phone] Patient Belongings The following personal items are in your possession at time of discharge: 
  Dental Appliances: None  Visual Aid: Glasses      Home Medications: None   Jewelry: None  Clothing: Pants, Shirt, Undergarments, Footwear, Socks    Other Valuables: Wallet (wallet with wife ) Please provide this summary of care documentation to your next provider. Signatures-by signing, you are acknowledging that this After Visit Summary has been reviewed with you and you have received a copy. Patient Signature:  ____________________________________________________________ Date:  ____________________________________________________________  
  
[facility] Perfect  Provider Signature: ____________________________________________________________ Date:  ____________________________________________________________

## 2018-07-23 NOTE — PERIOP NOTES
Handoff Report from Operating Room to PACU    Report received from [address] [facility]  and Claudia Singh CRNA  regarding Art El. Surgeon(s):  Alison Salcedo MD  And Procedure(s) (LRB):  L4-L5 LATERAL POSTERIOR DECOMPRESSION AND FUSION (germán munguia) (N/A)  confirmed   with allergies and dressings discussed. Anesthesia type, drugs, patient history, complications, estimated blood loss, vital signs, intake and output, and last pain medication and reversal medications were reviewed.

## 2018-07-23 NOTE — PERIOP NOTES
DUE TO THE NATURE OF THE PROCEDURE, THE SIZE OF THE INCISION, AND MULTIPLE SETS OF INSTRUMENTATION , A POST OP XRAY WAS OBTAINED OF THE LATERAL OPERATIVE SITE AND READ AS NEGATIVE FOR RETAINED INSTRUMENTS BY DR Zev Corcoran

## 2018-07-23 NOTE — H&P
Progress notes        Subjective:      Patient ID: Nancy Jimenez is a 79 y.o. male.     Chief Complaint: Follow-up of the Lower Back        HPI:  Nancy Jimenez is a 79 y.o. male s/p right L4-L5 and right L5-S1 LEVON done on 5/11/18. His pain is 5 out of 10 on the VAS. He had relief for about a day where is pain was 1 out of 10 on the VAS. Now, his pain has recurred bilaterally down lower extremities posteriorly. Worse on right leg than left leg. His pain is worse with standing first thing in the morning, better with moving and taking hot showers. He reports no relief from his LEVON.      There are no active problems to display for this patient.           Current Outpatient Prescriptions:     amLODIPine (NORVASC) 5 MG tablet, , Disp: , Rfl: 0    etodolac (LODINE) 400 MG tablet, Take 1 tablet (400 mg total) by mouth 2 (two) times a day., Disp: 60 tablet, Rfl: 11    losartan (COZAAR) 50 MG tablet, , Disp: , Rfl: 0    RABEprazole (ACIPHEX) 20 MG EC tablet, , Disp: , Rfl: 0    ranitidine (ZANTAC) 150 MG tablet, Take 150 mg by mouth once daily. , Disp: , Rfl: 0    gabapentin (NEURONTIN) 300 MG capsule, Take 1 capsule (300 mg total) by mouth 3 (three) times a day., Disp: 90 capsule, Rfl: 11    LORazepam (ATIVAN) 1 MG tablet, Take 1 tablet (1 mg total) by mouth See admin instructions. Take 1 tab 45min prior to injection, repeat if needed (Patient not taking: Reported on 5/31/2018 ), Disp: 2 tablet, Rfl: 0    tiZANidine (ZANAFLEX) 4 MG tablet, Take 1 tablet (4 mg total) by mouth every 8 (eight) hours as needed for muscle spasms. , Disp: 90 tablet, Rfl: 2           Allergies   Allergen Reactions    Gluten Meal Other (see comments)       Rye due to celiac    Barley Grass Rash       Celiac    Wheat Bran Rash       Celiac         ROS:   No new bowel or bladder incontinence. No fever. No saddle anesthesia.     Objective:          Vitals:     05/31/18 1511   BP: (!) 170/93         Body mass index is 33.23 kg/m². , a BMI over 30 is considered obese and a BMI over 40 has been associated with a higher risk of surgical complications.     Constitutional: No acute distress. Well nourished. HEENT: Normocephalic. Respiratory:  No labored breathing. Cardiovascular:  No marked cyanosis. Skin:  No marked skin ulcers/lesions on bilateral upper or lower extremities. Psychiatric: Alert and oriented x3. Inspection: No gross deformity of bilateral upper or lower extremities. Musculoskeletal/Neurological:   LEVON site looks healthy with no sign of infection   5/5 strength        Radiographs:     Order: XR SPINE LUMBAR 2 OR 3 VW - Indication: Spondylolisthesis, lumbar   region, Acute bilateral low back pain with bilateral sciatica, Bilateral   sciatica, Osteoarthritis of spine with radiculopathy, lumbar region,   Foraminal stenosis of lumbar region, Spinal stenosis, lumbar region, with   neurogenic claudication      X-ray Lumbar Spine 2 Or 3 Views     Result Date: 5/31/2018  Shielding: N/A. Standing. AP, Flexion, Extension.      Notes  X-rays of the lumbar spine show an L4-5 spondylolisthesis grade 1 that is   unstable with flexion extension. There are some calcifications of the   abdominal aorta.           Assessment:          ICD-10-CM   1. Spondylolisthesis, lumbar region M43.16   2. Acute bilateral low back pain with bilateral sciatica M54.42     M54.41   3. Bilateral sciatica M54.31     M54.32   4. Osteoarthritis of spine with radiculopathy, lumbar region M47.26   5. Foraminal stenosis of lumbar region M99.83   6. Spinal stenosis, lumbar region, with neurogenic claudication M48.062         Plan:      Mr. Cheryl Santacruz failed to have improvement from his LEVON. I reviewed his X-ray and discussed that he has an L4-5 spondylolisthesis grade 1 that is unstable. These findings are consistent with his symptoms. I offered a repeat injection, and he declines at this time.  I discussed surgery in detail with him today including risks, recovery, and restrictions. He understands and wants to proceed. I asked him to plan to be out of work for 2 months.      I have discussed the procedure in detail with the patient and mentioned complications, including but not limited to: death, permanent disability, heart attack, stroke, lung injury or infection, blindness, ileus, bladder or bowel problems, ureter injury, bleeding, nerve injury (including numbness, pain and weakness), paralysis (which may be permanent), failure to heal, failure to fuse bone together in fusion procedures, failure to relief symptoms, failure to relief pain, increased pain, need for further surgeries, failure or breakage or hardware, malpositioning of hardware, need to fuse or operate on additional levels determined either during or after surgery, destabilization of the spine (which may require fusion or later surgery), infections (which may or may not require additional surgery), dural tears (tears of the sac holding in nerves and spinal fluid), meningitis, voice changes, vocal cord injury, hoarseness, blood clots, pulmonary embolus, Fabio syndrome, recurrent disc herniation, diaphragm paralysis, and anesthetic complications. Comorbidities such as obesity, smoking, rheumatoid arthritis, chronic steroid use and diabetes increase these risks. The patient understands and wants to proceed.      The patient has been prescribed a LSO spinal orthosis. The orthosis is medically necessary to reduce pain by restricting mobility of the trunk and to otherwise support weak spinal muscles and/or deformed spine.   The patient will meet with our bracing coordinator to be fit for the brace.      Surgery will be an L4-L5 decompression and fusion         Orders Placed This Encounter    X-ray lumbar spine 2 or 3 views    BMI >=25 PATIENT INSTRUCTIONS & EDUCATION    gabapentin (NEURONTIN) 300 MG capsule    tiZANidine (ZANAFLEX) 4 MG tablet      Return for Follow up 2 weeks after surgery.           Charting performed by Lolis Armstrong in the presence of Theodora Glez MD.     This note has been transcribed electronically using voice recognition and a trained scribe. It is believed to be accurate, but may contain errors secondary to technological limitations and other factors.

## 2018-07-24 ENCOUNTER — APPOINTMENT (OUTPATIENT)
Dept: GENERAL RADIOLOGY | Age: 68
DRG: 455 | End: 2018-07-24
Attending: ORTHOPAEDIC SURGERY
Payer: MEDICARE

## 2018-07-24 ENCOUNTER — HOME HEALTH ADMISSION (OUTPATIENT)
Dept: HOME HEALTH SERVICES | Facility: HOME HEALTH | Age: 68
End: 2018-07-24
Payer: MEDICARE

## 2018-07-24 VITALS
HEIGHT: 69 IN | OXYGEN SATURATION: 97 % | DIASTOLIC BLOOD PRESSURE: 86 MMHG | WEIGHT: 228.62 LBS | HEART RATE: 92 BPM | SYSTOLIC BLOOD PRESSURE: 176 MMHG | RESPIRATION RATE: 18 BRPM | TEMPERATURE: 97.5 F | BODY MASS INDEX: 33.86 KG/M2

## 2018-07-24 LAB — HGB BLD-MCNC: 14.8 G/DL (ref 12.1–17)

## 2018-07-24 PROCEDURE — G8978 MOBILITY CURRENT STATUS: HCPCS

## 2018-07-24 PROCEDURE — 72100 X-RAY EXAM L-S SPINE 2/3 VWS: CPT

## 2018-07-24 PROCEDURE — 97116 GAIT TRAINING THERAPY: CPT

## 2018-07-24 PROCEDURE — 36415 COLL VENOUS BLD VENIPUNCTURE: CPT | Performed by: ORTHOPAEDIC SURGERY

## 2018-07-24 PROCEDURE — 74011250637 HC RX REV CODE- 250/637: Performed by: ORTHOPAEDIC SURGERY

## 2018-07-24 PROCEDURE — 74011250636 HC RX REV CODE- 250/636: Performed by: ORTHOPAEDIC SURGERY

## 2018-07-24 PROCEDURE — 97161 PT EVAL LOW COMPLEX 20 MIN: CPT

## 2018-07-24 PROCEDURE — 97535 SELF CARE MNGMENT TRAINING: CPT | Performed by: OCCUPATIONAL THERAPIST

## 2018-07-24 PROCEDURE — 97165 OT EVAL LOW COMPLEX 30 MIN: CPT | Performed by: OCCUPATIONAL THERAPIST

## 2018-07-24 PROCEDURE — G8987 SELF CARE CURRENT STATUS: HCPCS | Performed by: OCCUPATIONAL THERAPIST

## 2018-07-24 PROCEDURE — 85018 HEMOGLOBIN: CPT | Performed by: ORTHOPAEDIC SURGERY

## 2018-07-24 PROCEDURE — G8988 SELF CARE GOAL STATUS: HCPCS | Performed by: OCCUPATIONAL THERAPIST

## 2018-07-24 PROCEDURE — G8979 MOBILITY GOAL STATUS: HCPCS

## 2018-07-24 RX ORDER — ACETAMINOPHEN 500 MG
1000 TABLET ORAL EVERY 6 HOURS
Qty: 112 TAB | Refills: 0 | Status: SHIPPED
Start: 2018-07-24 | End: 2018-08-07

## 2018-07-24 RX ORDER — POLYETHYLENE GLYCOL 3350 17 G/17G
17 POWDER, FOR SOLUTION ORAL
Qty: 15 PACKET | Refills: 0 | Status: SHIPPED | OUTPATIENT
Start: 2018-07-24 | End: 2018-08-08

## 2018-07-24 RX ORDER — AMOXICILLIN 250 MG
1 CAPSULE ORAL DAILY
Qty: 30 TAB | Refills: 0 | Status: SHIPPED | OUTPATIENT
Start: 2018-07-24 | End: 2019-07-24

## 2018-07-24 RX ORDER — AMLODIPINE BESYLATE 5 MG/1
5 TABLET ORAL DAILY
Status: DISCONTINUED | OUTPATIENT
Start: 2018-07-25 | End: 2018-07-24 | Stop reason: HOSPADM

## 2018-07-24 RX ORDER — OXYCODONE HYDROCHLORIDE 5 MG/1
5-10 TABLET ORAL
Qty: 80 TAB | Refills: 0 | Status: SHIPPED | OUTPATIENT
Start: 2018-07-24 | End: 2019-07-24

## 2018-07-24 RX ORDER — ONDANSETRON 4 MG/1
4 TABLET, ORALLY DISINTEGRATING ORAL
Qty: 30 TAB | Refills: 0 | Status: SHIPPED | OUTPATIENT
Start: 2018-07-24 | End: 2019-07-24

## 2018-07-24 RX ADMIN — VITAMIN D, TAB 1000IU (100/BT) 2000 UNITS: 25 TAB at 08:17

## 2018-07-24 RX ADMIN — FLUTICASONE PROPIONATE 1 SPRAY: 50 SPRAY, METERED NASAL at 10:25

## 2018-07-24 RX ADMIN — POLYETHYLENE GLYCOL 3350 17 G: 17 POWDER, FOR SOLUTION ORAL at 08:16

## 2018-07-24 RX ADMIN — LOSARTAN POTASSIUM 50 MG: 50 TABLET ORAL at 08:17

## 2018-07-24 RX ADMIN — ACETAMINOPHEN 1000 MG: 500 TABLET ORAL at 09:50

## 2018-07-24 RX ADMIN — OXYCODONE HYDROCHLORIDE 10 MG: 5 TABLET ORAL at 09:50

## 2018-07-24 RX ADMIN — DEXAMETHASONE SODIUM PHOSPHATE 10 MG: 4 INJECTION, SOLUTION INTRAMUSCULAR; INTRAVENOUS at 15:44

## 2018-07-24 RX ADMIN — DOCUSATE SODIUM AND SENNOSIDES 1 TABLET: 8.6; 5 TABLET, FILM COATED ORAL at 08:17

## 2018-07-24 RX ADMIN — GABAPENTIN 300 MG: 300 CAPSULE ORAL at 15:44

## 2018-07-24 RX ADMIN — ACETAMINOPHEN 1000 MG: 500 TABLET ORAL at 04:20

## 2018-07-24 RX ADMIN — Medication 2 G: at 01:53

## 2018-07-24 RX ADMIN — FAMOTIDINE 20 MG: 20 TABLET ORAL at 17:03

## 2018-07-24 RX ADMIN — ONDANSETRON 4 MG: 2 INJECTION INTRAMUSCULAR; INTRAVENOUS at 15:49

## 2018-07-24 RX ADMIN — DOCUSATE SODIUM AND SENNOSIDES 1 TABLET: 8.6; 5 TABLET, FILM COATED ORAL at 17:03

## 2018-07-24 RX ADMIN — PANTOPRAZOLE SODIUM 40 MG: 40 TABLET, DELAYED RELEASE ORAL at 08:17

## 2018-07-24 RX ADMIN — THERA TABS 1 TABLET: TAB at 08:17

## 2018-07-24 RX ADMIN — OXYCODONE HYDROCHLORIDE 10 MG: 5 TABLET ORAL at 12:49

## 2018-07-24 RX ADMIN — HYDROMORPHONE HYDROCHLORIDE 1 MG: 2 INJECTION, SOLUTION INTRAMUSCULAR; INTRAVENOUS; SUBCUTANEOUS at 08:16

## 2018-07-24 RX ADMIN — AMLODIPINE BESYLATE 5 MG: 5 TABLET ORAL at 08:17

## 2018-07-24 RX ADMIN — GABAPENTIN 300 MG: 300 CAPSULE ORAL at 08:17

## 2018-07-24 RX ADMIN — ONDANSETRON 4 MG: 2 INJECTION INTRAMUSCULAR; INTRAVENOUS at 09:50

## 2018-07-24 RX ADMIN — Medication 10 ML: at 06:18

## 2018-07-24 RX ADMIN — Medication 10 ML: at 15:45

## 2018-07-24 RX ADMIN — OXYCODONE HYDROCHLORIDE 10 MG: 5 TABLET ORAL at 01:52

## 2018-07-24 RX ADMIN — OXYCODONE HYDROCHLORIDE 15 MG: 5 TABLET ORAL at 06:13

## 2018-07-24 RX ADMIN — ACETAMINOPHEN 1000 MG: 500 TABLET ORAL at 15:44

## 2018-07-24 RX ADMIN — Medication 2 G: at 08:16

## 2018-07-24 RX ADMIN — METFORMIN HYDROCHLORIDE 1500 MG: 500 TABLET, EXTENDED RELEASE ORAL at 08:17

## 2018-07-24 RX ADMIN — FAMOTIDINE 20 MG: 20 TABLET ORAL at 08:17

## 2018-07-24 RX ADMIN — OXYCODONE HYDROCHLORIDE 10 MG: 5 TABLET ORAL at 15:44

## 2018-07-24 NOTE — PROGRESS NOTES
Ortho NP Note:    Pt seen lying in bed. Complaints of nausea and vomiting after receiving IV dilaudid. He reports sensitivity to all narcotics.   Will d.c IV dilaudid and encouraged him to ask for PRN PO med with Zofran to see is that helps nausea.    + void 200 cc this morning (early.)  Garcia ee how he progresses with PT  Home today if clears PT, pain controlled, and tolerating PO meds with no more N/V    Steve Silverio, NP

## 2018-07-24 NOTE — PROGRESS NOTES
ORTHO POST OP SPINE PROGRESS NOTE    2018  Admit Date: 2018  Admit Diagnosis: LUMBAGO, RADICULOPATHYC STENOSIS, SPONDYLOLISTHESIS  Spinal stenosis of lumbar region with neurogenic claudication  Spondylisthesis  Procedure: Procedure(s):  L4-L5 LATERAL POSTERIOR DECOMPRESSION AND FUSION (germán munguia)  Post Op day: 1 Day Post-Op    Subjective:     Delaney Najera is a patient who has complaints has complaints of LBP, mild L hip pain s/p L4-5 post/lat fusion. tolerating po although not yet able to void. wife at bedside. nursing in with pt to assist to bathroom . Review of Systems: pert noted in hpi    Objective:     PT/OT:   Distance Ambulated:           Time Ambulated (min):        Ambulation Response: Activity Response: Fairly tolerated  Assistive Device:                   Vital Signs:    Blood pressure 99/59, pulse 76, temperature 97.8 °F (36.6 °C), resp. rate 16, height 5' 9\" (1.753 m), weight 103.7 kg (228 lb 9.9 oz), SpO2 98 %. Temp (24hrs), Av.8 °F (36.6 °C), Min:97.7 °F (36.5 °C), Max:98.1 °F (36.7 °C)         1901 -  0700  In: 0 [P.O.:150;  I.V.:1900]  Out: 250 [Urine:200]    LAB:    Recent Labs      18   0450   HGB  14.8       Wound/Drain Assessment:  Drain:      Dressing:     Physical Exam:  Neurological: no deficit  Incision clean, dry, and intact  5/5 BLE    Assessment:      Patient Active Problem List   Diagnosis Code    Dislocation closed, ankle left subtalar      GERD (gastroesophageal reflux disease) K21.9    Celiac disease K90.0    HTN (hypertension) I10    Ulcer [id number]    Knee osteoarthritis M17.10    Spinal stenosis of lumbar region with neurogenic claudication M48.062    Spondylisthesis M43.10       Plan:     Continue PT/OT/Rehab  Discontinue: IV  Consult: PT  and OT    Discharge To: home v rehab pending progress/ability to void       Signed By: JASVIR Catherine

## 2018-07-24 NOTE — PROGRESS NOTES
TRANSFER - IN REPORT:    Verbal report received from Pamella Rain RN(name) on Ari Mcnamara  being received from PACU(unit) for routine progression of care      Report consisted of patients Situation, Background, Assessment and   Recommendations(SBAR). Information from the following report(s) SBAR, Kardex, OR Summary, Procedure Summary, Intake/Output, MAR, Accordion, Recent Results and Med Rec Status was reviewed with the receiving nurse. Opportunity for questions and clarification was provided. Assessment completed upon patients arrival to unit and care assumed.

## 2018-07-24 NOTE — PROGRESS NOTES
Problem: Self Care Deficits Care Plan (Adult)  Goal: *Acute Goals and Plan of Care (Insert Text)  Occupational Therapy Goals:  Initiated 7/24/2018  1. Patient will perform grooming standing with independence within 7 days. 2. Patient will perform toileting with independence within 7 days. 3. Patient will perform upper body dressing and lower body dressing with modified independence with AE within 7 days. 4. Patient will transfer from toilet with independence within 7 days. Occupational Therapy EVALUATION  Patient: Agueda Escobar (89 y.o. male)  Date: 7/24/2018  Primary Diagnosis: LUMBAGO, RADICULOPATHYC STENOSIS, SPONDYLOLISTHESIS  Spinal stenosis of lumbar region with neurogenic claudication  Spondylisthesis  Procedure(s) (LRB):  L4-L5 LATERAL POSTERIOR DECOMPRESSION AND FUSION (germán munguia) (N/A) 1 Day Post-Op   Precautions:   Spinal (quick draw brace)    ASSESSMENT :  Based on the objective data described below, the patient presents with good balance and safety awareness. Wife was present this session and both pt and his wife were educated on LS precautions (issued handout) and on home safety with good understanding. Pt was able to perform log roll with independence after verbal instruction. Donned quick draw brace in standing with SBA and educated pt on adaptive strategies for donning brace. Limited range of motion in right hip and pt needed verbal cues not to bed to don pants over LE. Pt reports having difficulty at baseline with socks. Issued hip kit and educated pt on all components. Supervision for ADL mobility and toilet transfers without assist devices. Able to perform mock BM clean up safely adhering to precautions. Donned and doffed socks with AE with SBA. Pt is performing lower body ADLS at a min to moderate assist level (improved with hip kit). Pt will not need OT services at discharge.     Patient will benefit from skilled intervention to address the above impairments. Patients rehabilitation potential is considered to be Good  Factors which may influence rehabilitation potential include:   [x]             None noted  []             Mental ability/status  []             Medical condition  []             Home/family situation and support systems  []             Safety awareness  []             Pain tolerance/management  []             Other:      PLAN :  Recommendations and Planned Interventions:  [x]               Self Care Training                  [x]        Therapeutic Activities  [x]               Functional Mobility Training    []        Cognitive Retraining  [x]               Therapeutic Exercises           []        Endurance Activities  [x]               Balance Training                   []        Neuromuscular Re-Education  []               Visual/Perceptual Training     [x]   Home Safety Training  [x]               Patient Education                 [x]        Family Training/Education  []               Other (comment):    Frequency/Duration: Patient will be followed by occupational therapy 5 times a week to address goals.   Discharge Recommendations: home with family support  Further Equipment Recommendations for Discharge: issued hip kit     SUBJECTIVE:   Patient stated I know I am not suppose to bend or twist.    OBJECTIVE DATA SUMMARY:   HISTORY:   Past Medical History:   Diagnosis Date    Arthritis     right knee    Autoimmune disease ([facility] [address]     Celiac disease    Gastrointestinal disorder     acid reflux    GERD (gastroesophageal reflux disease)     Hiatal hernia     Hypertension     Ill-defined condition     hiatal hernia    Ill-defined condition     ruptured petalla rt    Other ill-defined conditions(799.89)     celiac disease    Other unknown and unspecified cause of morbidity or mortality     nasal polyps     Past Surgical History:   Procedure Laterality Date    HX ADENOIDECTOMY      HX HEENT      removal of polyps in septum and repaired deviated septum    HX ORTHOPAEDIC      right side patella surgery    HX OTHER SURGICAL      EGD/colonoscopy    HX OTHER SURGICAL      dislocation of left foot non surgical realignment     HX TONSILLECTOMY      ONC DX ESOPHAGEAL UNKNOWN      cyst removed    REVISION OF UNSTABLE PATELLA      right knee ruptured patella       Prior Level of Function/Environment/Context: decreased ability to don socks due to decreased ROM in LE; performed all ADLS without assist   Expanded or extensive additional review of patient history:     Home Situation  Home Environment: Private residence  # Steps to Enter: 0  One/Two Story Residence:  (Split Level 7 steps then another 7 steps)  Living Alone: No  Support Systems: Spouse/Significant Other/Partner  Patient Expects to be Discharged to[de-identified] Private residence  Current DME Used/Available at Home: None  Tub or Shower Type: Shower    Hand dominance: Right    EXAMINATION OF PERFORMANCE DEFICITS:  Cognitive/Behavioral Status:  Neurologic State: Alert  Orientation Level: Oriented X4  Cognition: Appropriate for age attention/concentration; Appropriate decision making; Appropriate safety awareness  Perception: Appears intact  Perseveration: No perseveration noted  Safety/Judgement: Awareness of environment; Fall prevention;Home safety        Hearing: Auditory  Auditory Impairment: None    Vision/Perceptual:                                Corrective Lenses: Glasses    Range of Motion:    AROM: Generally decreased, functional                         Strength:    Strength: Generally decreased, functional                Coordination:  Coordination: Within functional limits  Fine Motor Skills-Upper: Left Intact; Right Intact    Gross Motor Skills-Upper: Left Intact; Right Intact    Tone & Sensation:    Tone: Normal                         Balance:  Sitting: Intact  Standing: Impaired  Standing - Static: Good  Standing - Dynamic : Good    Functional Mobility and Transfers for ADLs:  Bed Mobility:  Rolling: Independent (log roll)  Supine to Sit: Independent  Scooting: Independent    Transfers:  Sit to Stand: Independent  Stand to Sit: Independent  Bed to Chair: Supervision  Toilet Transfer : Supervision    ADL Assessment:  Feeding: Independent    Oral Facial Hygiene/Grooming: Supervision (standing)    Bathing: Minimum assistance (LB)    Upper Body Dressing: Stand-by assistance (brace standing)    Lower Body Dressing: Moderate assistance (issued hip kit)    Toileting: Supervision                ADL Intervention and task modifications:     Patient instructed to don all clothing while sitting prior to standing, doff all clothing to knees while standing, then sit to doff clothing off from knees to feet in order to facilitate fall prevention, pain management, and energy conservation with ADLS. Patient indicated understanding/recalled strategies with min instruction. Educated pt in home safety and fall prevention. Suggestions to clear paths, adequate lighting, plan ahead, remove throw rugs, use non slip tub mat, etc.   Bathing: Patient instructed when bathing to not submerge wound in water, stand to shower or sponge bathe. Patient indicated understanding. Home safety: Patient instructed on home modifications and safety (raise height of ADL objects, appropriate height of chair surfaces, recliner safety, change of floor surfaces, clear pathways) to increase independence and fall prevention. Patient indicated understanding. Pt was given a handout on all LS precautions, home safety and adaptive strategies for ADLs. See assessment for dressing tx    Cognitive Retraining  Safety/Judgement: Awareness of environment; Fall prevention;Home safety      Functional Measure:  Barthel Index:    Bathin  Bladder: 10  Bowels: 10  Groomin  Dressin  Feeding: 10  Mobility: 10  Stairs: 5  Toilet Use: 5  Transfer (Bed to Chair and Back): 10  Total: 70       Barthel and G-code impairment scale:  Percentage of impairment CH  0% CI  1-19% CJ  20-39% CK  40-59% CL  60-79% CM  80-99% CN  100%   Barthel Score 0-100 100 99-80 79-60 59-40 20-39 1-19   0   Barthel Score 0-20 20 17-19 13-16 9-12 5-8 1-4 0      The Barthel ADL Index: Guidelines  1. The index should be used as a record of what a patient does, not as a record of what a patient could do. 2. The main aim is to establish degree of independence from any help, physical or verbal, however minor and for whatever reason. 3. The need for supervision renders the patient not independent. 4. A patient's performance should be established using the best available evidence. Asking the patient, friends/relatives and nurses are the usual sources, but direct observation and common sense are also important. However direct testing is not needed. 5. Usually the patient's performance over the preceding 24-48 hours is important, but occasionally longer periods will be relevant. 6. Middle categories imply that the patient supplies over 50 per cent of the effort. 7. Use of aids to be independent is allowed. Basim Mohan., Barthel, D.W. (6963). Functional evaluation: the Barthel Index. [facility] (14)2. Safia Gore, TENNILLE, Silvia Parker., [facility], [address] [place] [address] (1999). Measuring the change indisability after inpatient rehabilitation; comparison of the responsiveness of the Barthel Index and Functional [place] Measure. Journal of Neurology, Neurosurgery, and Psychiatry, 66(4), 888-793. Sharad Rojas, N.J.A, PRUDENCE Nelson, & Sachi Vines, MLAY. (2004.) Assessment of post-stroke quality of life in cost-effectiveness studies: The usefulness of the Barthel Index and the EuroQoL-5D. Quality of Life Research, 13, 688-04         G codes: In compliance with CMSs Claims Based Outcome Reporting, the following G-code set was chosen for this patient based on their primary functional limitation being treated:     The outcome measure chosen to determine the severity of the functional limitation was the barthel with a score of 70/100 which was correlated with the impairment scale. ? Self Care:     - CURRENT STATUS: CJ - 20%-39% impaired, limited or restricted    - GOAL STATUS: CI - 1%-19% impaired, limited or restricted    - D/C STATUS:  ---------------To be determined---------------     Occupational Therapy Evaluation Charge Determination   History Examination Decision-Making   LOW Complexity : Brief history review  LOW Complexity : 1-3 performance deficits relating to physical, cognitive , or psychosocial skils that result in activity limitations and / or participation restrictions  LOW Complexity : No comorbidities that affect functional and no verbal or physical assistance needed to complete eval tasks       Based on the above components, the patient evaluation is determined to be of the following complexity level: LOW   Pain:  Pain Scale 1: Visual  Pain Intensity 1: 0  Pain Location 1: Back  Pain Orientation 1: Posterior  Pain Description 1: Aching  Pain Intervention(s) 1: Medication (see MAR); Repositioned; Rest  Activity Tolerance:     Please refer to the flowsheet for vital signs taken during this treatment. After treatment:   [x] Patient left in no apparent distress sitting up in chair  [] Patient left in no apparent distress in bed  [x] Call bell left within reach  [x] Nursing notified  [] Caregiver present  [] Bed alarm activated    COMMUNICATION/EDUCATION:   The patients plan of care was discussed with: Physical Therapist, Registered Nurse and patient and his wife. [x] Home safety education was provided and the patient/caregiver indicated understanding. [x] Patient/family have participated as able in goal setting and plan of care. [x] Patient/family agree to work toward stated goals and plan of care. [] Patient understands intent and goals of therapy, but is neutral about his/her participation.   [] Patient is unable to participate in goal setting and plan of care. This patients plan of care is appropriate for delegation to GON[facility]ALO.     Thank you for this referral.  Mckenzie Payment, OTR/L  Time Calculation: 28 mins

## 2018-07-24 NOTE — PROGRESS NOTES
Problem: Falls - Risk of  Goal: *Absence of Falls  Document Jm Fall Risk and appropriate interventions in the flowsheet.    Outcome: Progressing Towards Goal  Fall Risk Interventions:  Mobility Interventions: Assess mobility with egress test, Communicate number of staff needed for ambulation/transfer, OT consult for ADLs, Patient to call before getting OOB, PT Consult for mobility concerns, PT Consult for assist device competence, Strengthening exercises (ROM-active/passive)    Mentation Interventions: Adequate sleep, hydration, pain control, Door open when patient unattended, Evaluate medications/consider consulting pharmacy, Eyeglasses and hearing aids, Familiar objects from home    Medication Interventions: Assess postural VS orthostatic hypotension, Evaluate medications/consider consulting pharmacy, Patient to call before getting OOB, Teach patient to arise slowly, Utilize gait belt for transfers/ambulation    Elimination Interventions: Call light in reach, Elevated toilet seat, Toilet paper/wipes in reach, Patient to call for help with toileting needs, Toileting schedule/hourly rounds, Urinal in reach    History of Falls Interventions: Consult care management for discharge planning, Door open when patient unattended, Evaluate medications/consider consulting pharmacy, Room close to nurse's station, Utilize gait belt for transfer/ambulation

## 2018-07-24 NOTE — PERIOP NOTES
TRANSFER - OUT REPORT:    Verbal report given to Guadlupe Mortimer RN (name) on Olga Montes  being transferred to [facility](unit) for routine post - op       Report consisted of patients Situation, Background, Assessment and   Recommendations(SBAR). Information from the following report(s) SBAR, Kardex, OR Summary, Intake/Output, MAR and Recent Results was reviewed with the receiving nurse. Opportunity for questions and clarification was provided.       Patient transported with:   O2 @ 2 liters, tech  Pt wife updated of pt status and bed assignment

## 2018-07-24 NOTE — DISCHARGE INSTRUCTIONS
[place]    Discharge Instruction Sheet: POSTERIOR SPINAL FUSION    DR. Efrain Peres    Pain control:   Typically, we will prescribe a narcotic usually 1-2 tabs every four hours is    sufficient for the pain. Most patients need this only for the first few weeks. You   should discontinue this as the pain decreases. You should not drive while taking any narcotic pain medications. Constipation  Pain medicines and anesthesia can be constipating-this can be prevented by gentle physical activity and drinking plenty of fluid. It should be treated with over-the-counter medications such as Miralax or suppositories, and/or Fleets enema. You should have a bowel movement at least every other day following surgery. Incision care     Keep this area clean and dry. Your dressing is designed to stay in place for 5-7 days. You will be sent home with one additional dressing to change at that time. Leave this new dressing in place until our follow up visit in the office in about 10-14 days. If staples are in place, they should be removed about 14-20 days after surgery. You may shower with this impermeable dressing in place. DO NOT take a tub bath or go swimming until cleared by your doctor. DO NOT apply lotions, oils, or creams to incision. To increase and promote healing:   Stop Smoking (or at least cut back on smoking).  Eat a well-balanced diet (high in protein and vitamin C)   If your appetite is poor, consider nutritional supplements like Ensure, Glucerna, or [place] Instant Breakfast.   If you are diabetic, controlling you blood sugars is very important to prevent infection and promote wound healing. Nutrition:   If you were on a supplement such as Ensure or Glucerna) while in the hospital, please continue using them with each meal for the next 30 days.    Eat a well-balanced diet - High in protein, high in vitamins and minerals, especially vitamin C and zinc.     Restrictions:   Remember your \"BLT's\"    1. Limited bending at waist    2. Lift no more than 10 pounds    3. No Twisting     If you were given a brace, wear it when out of bed. Warning signs : Please call your physician immediately at [phone] if you have   Bleeding from incision that is constant.  Change in mental status (unusual behavior or confusion)   If your incision develops redness or swelling   Change in wound drainage (increase in amount, color, or foul odor)   [place] over 101.5 degrees Fahrenheit    Headache that is not relieved with pain medication   Tenderness or redness in the calf of your leg    Emergency: CALL 911 if you have   Shortness of breath   Chest pain   Localized chest pain when coughing or taking a deep breath    Follow-up  Please call Dr. Vincenzo Morales office for a follow up appointment in 2 weeks at [phone]. You can return to work when cleared by a physician. During normal business hours you may reach Dr. Tomas Cole' team directly at [phone] if you have concerns or questions.     Malvin Valdivia

## 2018-07-24 NOTE — PROGRESS NOTES
Physical Therapy Goals Initiated 7/24/2018 1. Patient will ambulate with modified independence for 656 feet with the least restrictive device within 7 days. 2. Patient will ascend/descend 12 stairs with no handrail(s) with modified independence within 7 days. 3. Patient will verbalize and demonstrate understanding of spinal precautions (No bending, lifting greater than 5 lbs, or twisting; log-roll technique; frequent repositioning as instructed) within 4 days. 4. Patient will improve TUG score by 6 sec to indicate decreased fall risk within 7 days. physical Therapy EVALUATION Patient: Yomi Parker (56 y.o. male) Date: 7/24/2018 Primary Diagnosis: LUMBAGO, RADICULOPATHYC STENOSIS, SPONDYLOLISTHESIS Spinal stenosis of lumbar region with neurogenic claudication Spondylisthesis Procedure(s) (LRB): 
L4-L5 LATERAL POSTERIOR DECOMPRESSION AND FUSION (germán munguia) (N/A) 1 Day Post-Op Precautions:   Spinal 
 
ASSESSMENT : 
 
Patient Cleared from acute PT. Based on the objective data described below, the patient presents with expected post operative decrease in functional mobility. VSS throughout session. Patient reported 6/10 pain at rest and no increase in pain with activity. Patient received supine in bed with spouse present. Upon entry into room patient demonstrated log roll and independence with bed mobility and sit<>stand transfer. Patient able to don brace with setup required from author. Patient was able to ambulate 600ft with CGA and no assistive device. Gait was initially unsteady and needed min assist to correct posture. A Gait speed of 0.74 m/s was recorded indicating that the patient is walking at a speed consistent with community level ambulation and that the patient is at an increased risk of falling. A TUG score of 19 sec was recorded indicating that the patient is at an increased risk of falling.  Pain and expected post-operative ROM is limiting patient's gait speed ability at this time. 
 
Recommend D/C home with spouse and follow up from home health PT. No DME needed. Patient will benefit from skilled intervention to address the above impairments. Patients rehabilitation potential is considered to be Excellent Factors which may influence rehabilitation potential include:  
[x]         None noted 
[]         Mental ability/status []         Medical condition 
[]         Home/family situation and support systems 
[]         Safety awareness 
[]         Pain tolerance/management 
[]         Other: PLAN : 
Recommendations and Planned Interventions: 
[x]           Bed Mobility Training             []    Neuromuscular Re-Education 
[x]           Transfer Training                   []    Orthotic/Prosthetic Training 
[x]           Gait Training                         []    Modalities [x]           Therapeutic Exercises           []    Edema Management/Control 
[x]           Therapeutic Activities            [x]    Patient and Family Training/Education 
[]           Other (comment): Frequency/Duration: Patient will be followed by physical therapy  twice daily to address goals. Discharge Recommendations: Home Health Further Equipment Recommendations for Discharge: None SUBJECTIVE:  
Patient stated My goal is to leave here today.  OBJECTIVE DATA SUMMARY:  
HISTORY:   
Past Medical History:  
Diagnosis Date  Arthritis   
 right knee  Autoimmune disease ([facility] [address] Celiac disease  Gastrointestinal disorder   
 acid reflux  GERD (gastroesophageal reflux disease)  Hiatal hernia  Hypertension  Ill-defined condition   
 hiatal hernia  Ill-defined condition   
 ruptured petalla rt  Other ill-defined conditions(799.89)   
 celiac disease  Other unknown and unspecified cause of morbidity or mortality   
 nasal polyps Past Surgical History:  
Procedure Laterality Date  HX ADENOIDECTOMY  HX HEENT    
 removal of polyps in septum and repaired deviated septum  HX ORTHOPAEDIC    
 right side patella surgery  HX OTHER SURGICAL    
 EGD/colonoscopy  HX OTHER SURGICAL    
 dislocation of left foot non surgical realignment  HX TONSILLECTOMY  ONC DX ESOPHAGEAL UNKNOWN    
 cyst removed  REVISION OF UNSTABLE PATELLA    
 right knee ruptured patella Prior Level of Function/Home Situation: Patient lives at home with spouse and is previously independent with ADLs and ambulation without an assistive device. Patient reports no previous falls or regular exercise. Patient has had R TKA and received home health PT after that case, and reported benefits from home health. Personal factors and/or comorbidities impacting plan of care: None Home Situation Home Environment: Private residence # Steps to Enter: 0 One/Two Story Residence:  (Split Level 7 steps then another 7 steps) Living Alone: No 
Support Systems: Spouse/Significant Other/Partner Patient Expects to be Discharged to[de-identified] Private residence Current DME Used/Available at Home: None Tub or Shower Type: Shower EXAMINATION/PRESENTATION/DECISION MAKING:  
Critical Behavior: 
Neurologic State: Alert Orientation Level: Oriented X4 Cognition: Appropriate for age attention/concentration, Appropriate decision making, Appropriate safety awareness Hearing: Auditory Auditory Impairment: None Range Of Motion: 
AROM: Generally decreased, functional 
  
 Strength:   
Strength: Generally decreased, functional 
  
   
Tone & Sensation:  
Tone: Normal 
  
  
Coordination: 
Coordination: Within functional limits Functional Mobility: 
Bed Mobility: 
Rolling: Independent Supine to Sit: Independent Scooting: Independent Transfers: 
Sit to Stand: Independent Stand to Sit: Independent Balance:  
Sitting: Intact Standing: Impaired Standing - Static: Good Standing - Dynamic : Good Ambulation/Gait Training: 
Distance (ft): 600 Feet (ft) Ambulation - Level of Assistance: Contact guard assistance Gait Description (WDL): Exceptions to [facility] Gait Abnormalities: Decreased step clearance Base of Support: Widened Step Length: Right shortened;Left shortened Stairs: 
Number of Stairs Trained: 8 Stairs - Level of Assistance: Contact guard assistance Rail Use: Both (Used no rails to ascend, 2 to descend) Functional Measure: 
Gait Speed: 
 
Utilizing distance of 22 feet for test, as well as feet to meters calculation, patient ambulated at 0.74 m/s with self-selected gait speed. Bret RODRIGUEZ. \"Comfortable and maximum walking speed of adults aged 20-79 years: reference values and determinants. \" Age and Agin Volume 26(1):15-9. Gal Wise. \"Age- and gender-related test performance in community-dwelling elderly people: Six-Minute Walk Test, Mandujano Balance Scale, Timed Up & Go Test, and gait speeds. \" Physical Therapy: 2002 Volume 82(2):128-37. Jose Maria Gauthier DM, Kiarra PW, Luis MELÉNDEZD, [facility] SHEEHAN. \"Assessing stability and change of four performance measures: a longitudinal study evaluating outcome following total hip and knee arthroplasty. \" [facility] Musculoskeletal Disorders: 2005 Volume 6(3). Jimi Feng, PhD; Filomena Rivero, PhD. Angelique Stack Paper: \"Walking Speed: the Sixth Vital Sign\" Journal of Geriatric Physical Therapy: 2009 - Volume 32 - Issue 2 - p 25 . Sulema Robin DPT; Gabriela Arthur PhD; Erika Gupta PT PHD. Walking Speed: The Functional Vital Sign. Journal of Aging Phys Act 2015 April; 23(2):314-322. Timed up and go: 
 
Timed Get Up And Go Test: 19 Timed Up and Go and G-code impairment scale: 
Percentage of Impairment CH 
 
0% 
 CI 
 
1-19% CJ 
 
20-39% CK 
 
40-59% CL 
 
60-79% CM 
 
80-99% CN  
 
100% Timed Score 0-56 10 11-12 13-14 15-16 17-18 19 20  
 
 
< than 10 seconds=Normal  Greater then 13.5 seconds (in elderly)=Increased fall risk Camilo Russo the probability for falls in community dwelling older adults using the Timed Up and Go Test. Phys Ther. 2000;80:896-903. G codes: In compliance with CMSs Claims Based Outcome Reporting, the following G-code set was chosen for this patient based on their primary functional limitation being treated: The outcome measure chosen to determine the severity of the functional limitation was the TUG with a score of 19 sec which was correlated with the impairment scale. ? Mobility - Walking and Moving Around:  
  - CURRENT STATUS: CM - 80%-99% impaired, limited or restricted  - GOAL STATUS: CL - 60%-79% impaired, limited or restricted  - D/C STATUS:  ---------------To be determined---------------  
 
 
Pain: 
Pain Scale 1: Numeric (0 - 10) Pain Intensity 1: 3 Pain Location 1: Back Pain Orientation 1: Posterior Pain Description 1: Aching Pain Intervention(s) 1: Rest 
Activity Tolerance:  
Patient able to tolerate activity and required one rest break. VSS. Please refer to the flowsheet for vital signs taken during this treatment. After treatment:  
[x]         Patient left in no apparent distress sitting up in chair 
[]         Patient left in no apparent distress in bed 
[x]         Call bell left within reach [x]         Nursing notified 
[x]         Caregiver present 
[]         Bed alarm activated COMMUNICATION/EDUCATION:  
The patients plan of care was discussed with: Registered Nurse,  and NP. [x]         Fall prevention education was provided and the patient/caregiver indicated understanding. [x]         Patient/family have participated as able in goal setting and plan of care. [x]         Patient/family agree to work toward stated goals and plan of care. []         Patient understands intent and goals of therapy, but is neutral about his/her participation. []         Patient is unable to participate in goal setting and plan of care.  
 
Thank you for this referral. 
Nichole Enamorado, AUNG Time Calculation: 22 mins Regarding student involvement in patient care: A student participated in this treatment session. Per CMS Medicare statements and APTA guidelines I certify that the following was true: 1. I was present and directly observed the entire session. 2. I made all skilled judgments and clinical decisions regarding care. 3. I am the practitioner responsible for assessment, treatment, and documentation.

## 2018-07-24 NOTE — PROGRESS NOTES
CM updated AVS.     Care Management Interventions  PCP Verified by CM: Yes Pepper Loera MD )  Mode of Transport at Discharge:  Other (see comment) (Pt wife will drive pt home at time of discharge )  Transition of Care Consult (CM Consult): Home Health (Referral sent to [facility] for via [facility] )  [facility] - INPATIENT: Yes  MyChart Signup: No  Discharge Durable Medical Equipment: No  Physical Therapy Consult: Yes  Occupational Therapy Consult: Yes  Speech Therapy Consult: No  Current Support Network: Lives with Spouse, Own Home (Lives with spouse and brother )  Confirm Follow Up Transport: Family (Pt will drive pt until pt is cleared medically to drive )  Plan discussed with Pt/Family/Caregiver: Yes  Freedom of Choice Offered: Yes  Discharge Location  Discharge Placement: Home with home health     BUZZ Means, [address]   [phone]

## 2018-07-24 NOTE — PROGRESS NOTES
Reason for Admission:  LUMBAGO, [address], SPONDYLOLISTHESIS; Spinal stenosis of lumbar region with neurogeni*                    RRAT Score:  8 LOW            Plan for utilizing home health:  Pt has utilized [facility] in the past but name unknown. Pt prefers to utilize [facility]; referral sent through                      Likelihood of Readmission:  Low per acuity. Pt has a strong support system. No problems financially or accessing medications. Pt has no concerns for discharge at this time. Transition of Care Plan:                    Pt is a 79year old,  male admitted with LUMBAGO, [address]; Spinal stenosis of lumbar region with neurogeni*. Pt was alert and oriented x3 during initial meeting with CM and pt wife. Pt confirmed address, emergency contact and PCP. Pt states he lives in a 2 story home with 14 steps, (7 steps, landing, 7 steps). Pt does not have any DME at this time. Pt had [facility] 3 years ago but name unknown. Pt has not been to a SNF before. Pt preffered pharmacy is [facility] in [place] [phone]. Pt wife will drive him home at time of discharge and until he his medically cleared. Care Management Interventions  PCP Verified by CM: Yes Ben Richardson MD )  Mode of Transport at Discharge:  Other (see comment) (Pt wife will drive pt home at time of discharge )  Transition of Care Consult (CM Consult): Home Health (Referral [facility] for via [facility] )  [address]: Yes  MyChart Signup: No  Discharge Durable Medical Equipment: No  Physical Therapy Consult: Yes  Occupational Therapy Consult: Yes  Speech Therapy Consult: No  Current Support Network: Lives with Spouse, Own Home (Lives with spouse and brother )  Confirm Follow Up Transport: Family (Pt will drive pt until pt is cleared medically to drive )  Plan discussed with Pt/Family/Caregiver: Yes  Freedom of Choice Offered: Yes  Discharge Location  Discharge Placement: Home with home health    CM set up [facility] with [address] [facility] through [address] [place] [address]. [place] of Choice was signed and placed on the chart. CM will continue to follow pt for discharge planning as needed.      Rebecca León, BUZZ, [address]   [phone]

## 2018-07-24 NOTE — OP NOTES
[address]  OPERATIVE REPORT    Evelyn Renae  MR#: [id number]  : 1950  ACCOUNT #: [de-identified]   DATE OF SERVICE: 2018    PREOPERATIVE DIAGNOSES:  1. L4-L5 spondylolisthesis. 2.  Lumbago. 3.  Sciatica. 4.  Spinal stenosis with claudication. POSTOPERATIVE DIAGNOSES:    1. L4-L5 spondylolisthesis. 2.  Lumbago. 3.  Sciatica. 4.  Spinal stenosis with claudication. PROCEDURE PERFORMED:  1. L4-L5 posterior fusion. 2. L4-L5 posterior instrumentation. SURGEON:  Natalia Ocampo MD    FIRST ASSISTANT:  Bharat Burciaga PA-C    ESTIMATED BLOOD LOSS:  50 mL. COMPLICATIONS:  None. SPECIMENS REMOVED:  None. ANESTHESIA:  General.    DRAINS:  None. IMPLANTS:  [employer]s Viper Prime pedicle screw system. INDICATIONS FOR THE PROCEDURE:  The patient is a pleasant 71-year-old gentleman with lumbar spinal stenosis and a spondylolisthesis resulting in lumbago and sciatica and has failed to improve with nonoperative treatment. At this point, he would like to proceed with surgical intervention. I have given him warnings about possible complications including but not limited to pain, scar, bleeding, infection, nonunion, damage to surrounding structures, death, paralysis, blindness, stroke. He understands and wants to proceed. DESCRIPTION OF PROCEDURE:  Informed consent was obtained and the operative site was properly marked. The patient was brought back to the operating room and underwent general endotracheal anesthesia. He underwent an anterior approach, which has been dictated separately, and once that was completed, he was flipped in the prone position on [facility] table 4-poster frame. He was then placed in the 90/90 position, knees were gently bent with pillows. Fluoroscopy was used to josefa the level of the incision. I then proceeded to prep and drape in the usual manner.   A timeout was obtained, verifying that this was the correct patient, the correct surgery, the correct site, as well as that he had received antibiotics within 30 minutes of the incision. I then proceeded to perform a standard posterior approach to the lumbar spine, exposing the area from L4 through L5. Once the area was exposed and hemostasis obtained, fluoroscopy was used to verify that we were in the correct level. I then proceeded to use a Viper Prime pedicle screw system that had been premeasured and proceeded to place it in the 3 o'clock and 9 o'clock position in the pedicle. Then through advancing the stylet, we were able to get it into the adequate position on AP fluoroscopy and advanced the screw. This was done bilaterally at L4 and L5. Once that was completed, I proceeded then to measure it, place my adriana and locking caps, and final tightened the construct in place. The reduction tabs were removed. The posterior elements were then decorticated with a Midas Ernesto, and the cortical bone fibers and bone marrow aspirate that we had obtained in the anterior approach were placed on the posterior elements bilaterally for the posterior fusion. Once this was completed, I proceeded then to close the fascia with #1 Vicryl figure-of-eight interrupted sutures, followed by irrigating the subcu, closed the subcu with 2-0 Vicryl and the skin with a 3-0 running Monocryl and Dermabond. Sterile dressing was applied. The patient was then awakened and transferred to PACU in stable condition. POSTOPERATIVE PLAN:  The patient is going to remain here 1 or 2 days. We are going to give him SCDs and SERAFIN hose for DVT prophylaxis and Ancef for infection prophylaxis.       MD ZULLY Manzo / SOFIA  D: 07/23/2018 19:11     T: 07/23/2018 20:07  JOB #: [id number]  CC: Salome Brar MD  CC: [facility]

## 2018-07-24 NOTE — DISCHARGE SUMMARY
Spine Discharge Summary    Patient ID:  Michelle Roberts  [id number]  male  79 y.o.  1950    Admit date: 7/23/2018    Discharge date: 7/24/2018    Admitting Physician: Haris Gold MD     Consulting Physician(s):   Treatment Team: Attending Provider: Haris Gold MD; Care Manager: Natalee Sewell    Date of Surgery:   7/23/2018     Preoperative Diagnosis:  LUMBAGO, RADICULOPATHYC STENOSIS, SPONDYLOLISTHESIS    Postoperative Diagnosis:   LUMBAGO, RADICULOPATHYC STENOSIS, SPONDYLOLISTHESIS    Procedure(s):  L4-L5 LATERAL POSTERIOR DECOMPRESSION AND FUSION (germán munguia)     Anesthesia Type:   General     Surgeon: Haris Gold MD                            HPI:  Pt is a 79 y.o. male who has a history of LUMBAGO, [address], SPONDYLOLISTHESIS  with pain and limitations of activities of daily living who presents at this time for a L4-L5 Lateral Posterior Decompression and Fusion following the failure of conservative management. PMH:   Past Medical History:   Diagnosis Date    Arthritis     right knee    Autoimmune disease (HCC)     Celiac disease    Gastrointestinal disorder     acid reflux    GERD (gastroesophageal reflux disease)     Hiatal hernia     Hypertension     Ill-defined condition     hiatal hernia    Ill-defined condition     ruptured petalla rt    Other ill-defined conditions(799.89)     celiac disease    Other unknown and unspecified cause of morbidity or mortality     nasal polyps       Body mass index is 33.76 kg/(m^2). BMI greater than 30 is classified as obesity. Medications upon admission :   Prior to Admission Medications   Prescriptions Last Dose Informant Patient Reported? Taking? RABEprazole (ACIPHEX) 20 mg tablet 7/23/2018 at 0800  Yes No   Sig: Take 20 mg by mouth every morning. SODIUM CHLORIDE (SALINE MIST NA) 7/20/2018  Yes No   Sig: by Nasal route as needed.  This is a medi pot usage   amlodipine (NORVASC) 5 mg tablet 7/23/2018 at 0800  Yes Yes   Sig: Take 5 mg by mouth every morning. Indications: HYPERTENSION   cholecalciferol, vitamin D3, (VITAMIN D3) 2,000 unit tab 7/15/2018  Yes No   Sig: Take  by mouth daily. etodolac (LODINE) 400 mg tablet 7/15/2018  Yes No   Sig: Take 400 mg by mouth two (2) times daily (with meals). gabapentin (NEURONTIN) 300 mg capsule 2018 at 1600  Yes No   Sig: Take 300 mg by mouth three (3) times daily. losartan (COZAAR) 50 mg tablet 2018 at 1000  Yes No   Sig: Take 50 mg by mouth every morning. metFORMIN (GLUCOPHAGE) 1,000 mg tablet 2018  Yes Yes   Sig: Take 1,500 mg by mouth daily. mometasone (NASONEX) 50 mcg/actuation nasal spray 2018 at 0800  Yes Yes   Si Sprays by Both Nostrils route daily. multivitamin (ONE A DAY) tablet 7/15/2018  Yes No   Sig: Take 1 Tab by mouth daily. tiZANidine (ZANAFLEX) 4 mg capsule 2018 at 1600  Yes No   Sig: Take 4 mg by mouth every eight (8) hours as needed. Facility-Administered Medications: None        Allergies: Allergies   Allergen Reactions    Other Food Other (comments)     Rye due to celiac    Barley Other (comments)     Celiac    Wheat Other (comments)     Celiac        Hospital Course: The patient underwent surgery. Intra-operative complications: None; patient tolerated the procedure well. Was taken to the PACU in stable condition and then transferred to the ortho floor. Perioperative Antibiotics:  Ancef     Postoperative Pain Management:  Oxycodone     Postoperative transfusions:    Number of units banked PRBCs =   none     Post Op complications: PONV due to medications. Given Zofran with Oxycodone with good effect and able to tolerate PO. Hemoglobin at discharge:    Lab Results   Component Value Date/Time    HGB 14.8 2018 04:50 AM    INR 1.0 2018 01:49 PM       Dressing  - clean, dry and intact. No significant erythema or swelling. Neurovascular exam found to be within normal limits.  Wound appears to be healing without any evidence of infection. Physical Therapy started on the day following surgery and participated in bed mobility, transfers and ambulation. Gait:  Gait  Base of Support: Widened  Step Length: Right shortened, Left shortened  Gait Abnormalities: Decreased step clearance  Ambulation - Level of Assistance: Contact guard assistance  Distance (ft): 600 Feet (ft)  Rail Use: Both (Used no rails to ascend, 2 to descend)  Stairs - Level of Assistance: Contact guard assistance  Number of Stairs Trained: 8                   Discharged to: Home    Condition on Discharge:   stable    Discharge instructions:    - Take pain medications as prescribed  - Resume pre hospital diet      - Discharge activity: activity as tolerated  - Ambulate as tolerated  - Wound Care Keep wound clean and dry. See discharge instruction sheet. -DISCHARGE MEDICATION LIST     Current Discharge Medication List      START taking these medications    Details   acetaminophen (TYLENOL) 500 mg tablet Take 2 Tabs by mouth every six (6) hours for 14 days. Qty: 112 Tab, Refills: 0      oxyCODONE IR (ROXICODONE) 5 mg immediate release tablet Take 1-2 Tabs by mouth every four (4) hours as needed. Max Daily Amount: 60 mg. Take with food  Qty: 80 Tab, Refills: 0    Associated Diagnoses: S/P lumbar spinal fusion      senna-docusate (PERICOLACE) 8.6-50 mg per tablet Take 1 Tab by mouth daily. Qty: 30 Tab, Refills: 0      polyethylene glycol (MIRALAX) 17 gram packet Take 1 Packet by mouth daily as needed (constipation) for up to 15 days. Qty: 15 Packet, Refills: 0      ondansetron (ZOFRAN ODT) 4 mg disintegrating tablet Take 1 Tab by mouth every eight (8) hours as needed for Nausea. Qty: 30 Tab, Refills: 0         CONTINUE these medications which have NOT CHANGED    Details   metFORMIN (GLUCOPHAGE) 1,000 mg tablet Take 1,500 mg by mouth daily. mometasone (NASONEX) 50 mcg/actuation nasal spray 2 Sprays by Both Nostrils route daily. amlodipine (NORVASC) 5 mg tablet Take 5 mg by mouth every morning. Indications: HYPERTENSION      cholecalciferol, vitamin D3, (VITAMIN D3) 2,000 unit tab Take  by mouth daily. gabapentin (NEURONTIN) 300 mg capsule Take 300 mg by mouth three (3) times daily. tiZANidine (ZANAFLEX) 4 mg capsule Take 4 mg by mouth every eight (8) hours as needed. etodolac (LODINE) 400 mg tablet Take 400 mg by mouth two (2) times daily (with meals). RABEprazole (ACIPHEX) 20 mg tablet Take 20 mg by mouth every morning.      multivitamin (ONE A DAY) tablet Take 1 Tab by mouth daily. SODIUM CHLORIDE (SALINE MIST NA) by Nasal route as needed. This is a medi pot usage      losartan (COZAAR) 50 mg tablet Take 50 mg by mouth every morning.           per medical continuation form      -Follow up in office in 2 weeks      Signed:  Robert Stephen  MSN, APRN, FNP-C, [facility]  Orthopaedic/Neurosurgery Nurse Practitioner    7/24/2018  3:54 PM

## 2018-07-24 NOTE — PROGRESS NOTES
Reviewed discharge instructions, prescriptions, and side effects with patient and his wife. Reviewed wound care, follow-up instructions, and medication instructions. Answered all questions and provided contact information for future questions. Took IV out per policy, Catheter tip intact. Going home in a car with family.

## 2018-07-25 ENCOUNTER — HOME CARE VISIT (OUTPATIENT)
Dept: SCHEDULING | Facility: HOME HEALTH | Age: 68
End: 2018-07-25
Payer: MEDICARE

## 2018-07-25 VITALS
RESPIRATION RATE: 13 BRPM | TEMPERATURE: 97.9 F | HEART RATE: 73 BPM | DIASTOLIC BLOOD PRESSURE: 84 MMHG | SYSTOLIC BLOOD PRESSURE: 151 MMHG | OXYGEN SATURATION: 97 %

## 2018-07-25 PROCEDURE — 400013 HH SOC

## 2018-07-25 PROCEDURE — G0151 HHCP-SERV OF PT,EA 15 MIN: HCPCS

## 2018-07-27 ENCOUNTER — HOME CARE VISIT (OUTPATIENT)
Dept: SCHEDULING | Facility: HOME HEALTH | Age: 68
End: 2018-07-27
Payer: MEDICARE

## 2018-07-27 VITALS
TEMPERATURE: 97.5 F | RESPIRATION RATE: 14 BRPM | HEART RATE: 69 BPM | DIASTOLIC BLOOD PRESSURE: 82 MMHG | OXYGEN SATURATION: 96 % | SYSTOLIC BLOOD PRESSURE: 156 MMHG

## 2018-07-27 PROCEDURE — G0151 HHCP-SERV OF PT,EA 15 MIN: HCPCS

## 2018-07-30 ENCOUNTER — HOME CARE VISIT (OUTPATIENT)
Dept: SCHEDULING | Facility: HOME HEALTH | Age: 68
End: 2018-07-30
Payer: MEDICARE

## 2018-07-30 VITALS
DIASTOLIC BLOOD PRESSURE: 89 MMHG | SYSTOLIC BLOOD PRESSURE: 142 MMHG | OXYGEN SATURATION: 97 % | RESPIRATION RATE: 14 BRPM | HEART RATE: 73 BPM | TEMPERATURE: 97.6 F

## 2018-07-30 PROCEDURE — G0151 HHCP-SERV OF PT,EA 15 MIN: HCPCS

## 2018-08-02 ENCOUNTER — HOME CARE VISIT (OUTPATIENT)
Dept: SCHEDULING | Facility: HOME HEALTH | Age: 68
End: 2018-08-02
Payer: MEDICARE

## 2018-08-02 VITALS
OXYGEN SATURATION: 95 % | RESPIRATION RATE: 14 BRPM | HEART RATE: 76 BPM | TEMPERATURE: 97.5 F | DIASTOLIC BLOOD PRESSURE: 95 MMHG | SYSTOLIC BLOOD PRESSURE: 154 MMHG

## 2018-08-02 PROCEDURE — G0151 HHCP-SERV OF PT,EA 15 MIN: HCPCS

## 2018-09-17 ENCOUNTER — HOSPITAL ENCOUNTER (OUTPATIENT)
Dept: MRI IMAGING | Age: 68
Discharge: HOME | End: 2018-09-17
Attending: ORTHOPAEDIC SURGERY
Payer: MEDICARE

## 2018-09-17 DIAGNOSIS — M54.5 LOW BACK PAIN, UNSPECIFIED BACK PAIN LATERALITY, UNSPECIFIED CHRONICITY, WITH SCIATICA PRESENCE UNSPECIFIED: ICD-10-CM

## 2018-09-17 DIAGNOSIS — M54.42 ACUTE BILATERAL LOW BACK PAIN WITH BILATERAL SCIATICA: ICD-10-CM

## 2018-09-17 DIAGNOSIS — M54.41 ACUTE BILATERAL LOW BACK PAIN WITH BILATERAL SCIATICA: ICD-10-CM

## 2018-09-17 DIAGNOSIS — M54.32 BILATERAL SCIATICA: ICD-10-CM

## 2018-09-17 DIAGNOSIS — M54.31 BILATERAL SCIATICA: ICD-10-CM

## 2018-09-17 PROCEDURE — 74011250636 HC RX REV CODE- 250/636: Performed by: ORTHOPAEDIC SURGERY

## 2018-09-17 PROCEDURE — 72158 MRI LUMBAR SPINE W/O & W/DYE: CPT

## 2018-09-17 PROCEDURE — A9575 INJ GADOTERATE MEGLUMI 0.1ML: HCPCS | Performed by: ORTHOPAEDIC SURGERY

## 2018-09-17 RX ORDER — GADOTERATE MEGLUMINE 376.9 MG/ML
20 INJECTION INTRAVENOUS
Status: COMPLETED | OUTPATIENT
Start: 2018-09-17 | End: 2018-09-17

## 2018-09-17 RX ADMIN — GADOTERATE MEGLUMINE 20 ML: 376.9 INJECTION INTRAVENOUS at 12:49

## 2019-07-24 NOTE — PERIOP NOTES
[facility]  Ambulatory Surgery Unit  Pre-operative Instructions    Surgery/Procedure Date 08/2/19          Tentative Arrival Time tbd      1. On the day of your surgery/procedure, please report to the Ambulatory Surgery Unit Registration Desk and sign in at your designated time. The Ambulatory Surgery Unit is located in [facility] on the [facility] side of the [facility] across from the [address] [place] [address]. Please have all of your health insurance cards and a photo ID. 2. You must have someone with you to drive you home, as you should not drive a car for 24 hours following anesthesia. Please make arrangements for a responsible adult friend or family member to stay with you for at least the first 24 hours after your surgery. 3. Do not have anything to eat or drink (including water, gum, mints, coffee, juice) after 11:59 PM  08/1/19. This may not apply to medications prescribed by your physician. (Please note below the special instructions with medications to take the morning of surgery, if applicable.)    4. We recommend you do not drink any alcoholic beverages for 24 hours before and after your surgery. 5. Contact your surgeons office for instructions on the following medications: non-steroidal anti-inflammatory drugs (i.e. Advil, Aleve), vitamins, and supplements. (Some surgeons will want you to stop these medications prior to surgery and others may allow you to take them)   **If you are currently taking Plavix, Coumadin, Aspirin and/or other blood-thinning agents, contact your surgeon for instructions. ** Your surgeon will partner with the physician prescribing these medications to determine if it is safe to stop or if you need to continue taking. Please do not stop taking these medications without instructions from your surgeon.     6. In an effort to help prevent surgical site infection, we ask that you shower with an anti-bacterial soap (i.e. Dial/Safeguard, or the soap provided to you at your preadmission testing appointment) for 3 days prior to and on the morning of surgery, using a fresh towel after each shower. (Please begin this process with fresh bed linens.) Do not apply any lotions, powders, or deodorants after the shower on the day of your procedure. If applicable, please do not shave the operative site for 48 hours prior to surgery. 7. Wear comfortable clothes. Wear glasses instead of contacts. Do not bring any jewelry or money (other than copays or fees as instructed). Do not wear make-up, particularly mascara, the morning of your surgery. Do not wear nail polish, particularly if you are having foot /hand surgery. Wear your hair loose or down, no ponytails, buns, gregoria pins or clips. All body piercings must be removed. 8. You should understand that if you do not follow these instructions your surgery may be cancelled. If your physical condition changes (i.e. fever, cold or flu) please contact your surgeon as soon as possible. 9. It is important that you be on time. If a situation occurs where you may be late, or if you have any questions or problems, please call [phone].    10. Your surgery time may be subject to change. You will receive a phone call the day prior to surgery to confirm your arrival time. 11. Pediatric patients: please bring a change of clothes, diapers, bottle/sippy cup, pacifier, etc.      Special Instructions: Take all medications and inhalers, as prescribed, on the morning of surgery with a sip of water EXCEPT: n/a      I understand a pre-operative phone call will be made to verify my surgery time. In the event that I am not available, I give permission for a message to be left on my answering service and/or with another person?       yes         ___________________      ___________________      ________________  (Signature of Patient)          (Witness)                   (Date and Time)

## 2019-08-01 ENCOUNTER — ANESTHESIA EVENT (OUTPATIENT)
Dept: SURGERY | Age: 69
End: 2019-08-01
Payer: MEDICARE

## 2019-08-01 PROBLEM — S46.012A TRAUMATIC COMPLETE TEAR OF LEFT ROTATOR CUFF: Status: ACTIVE | Noted: 2019-08-01

## 2019-08-01 NOTE — ANESTHESIA PREPROCEDURE EVALUATION
Anesthetic History   No history of anesthetic complications            Review of Systems / Medical History  Patient summary reviewed, nursing notes reviewed and pertinent labs reviewed    Pulmonary  Within defined limits                 Neuro/Psych   Within defined limits          Comments: S/P Lateral L4-L5 Decompression and Fusion (7/23/18)  Neurogenic Claudication Cardiovascular    Hypertension              Exercise tolerance: >4 METS     GI/Hepatic/Renal     GERD      Hiatal hernia    Comments: Celiac Disease Endo/Other        Obesity and arthritis     Other Findings   Comments:  Left Rotator Cuff Tear           Physical Exam    Airway  Mallampati: III  TM Distance: 4 - 6 cm  Neck ROM: normal range of motion   Mouth opening: Normal     Cardiovascular  Regular rate and rhythm,  S1 and S2 normal,  no murmur, click, rub, or gallop             Dental    Dentition: Lower partial plate     Pulmonary  Breath sounds clear to auscultation               Abdominal  GI exam deferred       Other Findings            Anesthetic Plan    ASA: 2  Anesthesia type: general    Monitoring Plan: BIS  Post-op pain plan if not by surgeon: peripheral nerve block single    Induction: Intravenous  Anesthetic plan and risks discussed with: Patient

## 2019-08-01 NOTE — PERIOP NOTES
Patient called this afternoon to report he had left the dentist with a Rx for Amoxicillin for an infected tooth. Patient called to verify information. Pt denies running a fever and denies that the dentist states an abcess. Pt states he will get his tooth pulled next week sometime. Sammi DELGADO informed of this and he was okay to proceed as long as the patient was on abx.     Patient still to report at 0645 tmw am.

## 2019-08-01 NOTE — H&P
PRE- OP History and Physical                             Subjective:     Patient is a 71 y.o. male presented with a history of left shoulder pain and to discuss surgery. He has been having pain now for 6-8 months which he believes started after trying to pull and lift a recliner. Since then he has been having progressively worsening severe pain and weakness. Pain causes difficulty sleeping and night awakening. Pain with reaching and lifting overhead. Rates the sharp stabbing achy pain as an 8/10 at its worse in the last two weeks. He has tried 2 cortisone shots as well as formal PT without significant improvement. He also notes he has been having some increased right shoulder from no injury, but his left shoulder is much worse. He is a nondiabetic, nonsmoker. .  The patient's condition has been resistant to non-operative treatment and is being admitted for surgical management of this condition.      Patient Active Problem List    Diagnosis Date Noted    Traumatic complete tear of left rotator cuff 08/01/2019    Spinal stenosis of lumbar region with neurogenic claudication 07/23/2018    Spondylisthesis 07/23/2018    Knee osteoarthritis 06/14/2016    Dislocation closed, ankle left subtalar  12/29/2010    GERD (gastroesophageal reflux disease) 12/29/2010    Celiac disease 12/29/2010    HTN (hypertension) 12/29/2010    Ulcer 12/29/2010     Past Medical History:   Diagnosis Date    Arthritis     right knee    Autoimmune disease ([facility]     Celiac disease    Celiac disease     Gastrointestinal disorder     acid reflux    GERD (gastroesophageal reflux disease)     Hiatal hernia     Hypertension     Ill-defined condition     hiatal hernia    Ill-defined condition     ruptured petalla rt    Other unknown and unspecified cause of morbidity or mortality     nasal polyps      Past Surgical History:   Procedure Laterality Date    HX ADENOIDECTOMY      HX HEENT      removal of polyps in septum and repaired deviated septum    HX LUMBAR FUSION  07/2018    L 4-L5     HX ORTHOPAEDIC      right side patella surgery    HX OTHER SURGICAL      EGD/colonoscopy    HX OTHER SURGICAL      dislocation of left foot non surgical realignment     HX TONSILLECTOMY      ONC DX ESOPHAGEAL UNKNOWN      cyst removed    REVISION OF UNSTABLE PATELLA      right knee ruptured patella      Prior to Admission medications    Medication Sig Start Date End Date Taking? Authorizing Provider   amoxicillin (AMOXIL) 875 mg tablet Take 875 mg by mouth three (3) times daily. Yes Provider, Historical   cholecalciferol, vitamin D3, 2,000 unit tab Take 1 Tab by mouth daily. 7/25/18  Yes Provider, Historical   tiZANidine (ZANAFLEX) 4 mg tablet Take 4 mg by mouth every six (6) hours as needed (Muscle Spasms). 7/24/18  Yes Provider, Historical   sodium chloride (SALINE MIST NA) 1 Spray by Nasal route as needed (NASAL DRYNESS). 7/24/18  Yes Provider, Historical   fluticasone (FLONASE ALLERGY RELIEF) 50 mcg/actuation nasal spray 2 Sprays by Both Nostrils route daily. 7/24/18  Yes Provider, Historical   gabapentin (NEURONTIN) 300 mg capsule Take 300 mg by mouth three (3) times daily. Yes Provider, Historical   etodolac (LODINE) 400 mg tablet Take 400 mg by mouth two (2) times daily (with meals). Yes Provider, Historical   RABEprazole (ACIPHEX) 20 mg tablet Take 20 mg by mouth every morning. Yes Provider, Historical   multivitamin (ONE A DAY) tablet Take 1 Tab by mouth daily. Yes Provider, Historical   losartan (COZAAR) 50 mg tablet Take 50 mg by mouth every morning. Yes Provider, Historical   amlodipine (NORVASC) 5 mg tablet Take 5 mg by mouth every morning.  Indications: HYPERTENSION   Yes Provider, Historical     Allergies   Allergen Reactions    Other Food Other (comments)     Rye due to celiac    Barley Other (comments)     Celiac    Wheat Other (comments)     Celiac      Social History Tobacco Use    Smoking status: Never Smoker    Smokeless tobacco: Never Used   Substance Use Topics    Alcohol use: No      Family History   Problem Relation Age of Onset    Heart Disease Mother     Heart Disease Brother     Hypertension Paternal Uncle       Review of Systems  A comprehensive review of systems was negative except for that written in the HPI. Objective:     Patient Vitals for the past 8 hrs:   BP Temp Pulse Resp SpO2 Height Weight   08/02/19 0707 (!) 156/96 98 °F (36.7 °C) 75 14 95 % 5' 9\" (1.753 m) 99.3 kg (219 lb)     Visit Vitals  BP (!) 156/96 (BP 1 Location: Right arm, BP Patient Position: At rest)   Pulse 75   Temp 98 °F (36.7 °C)   Resp 14   Ht 5' 9\" (1.753 m)   Wt 99.3 kg (219 lb)   SpO2 95%   BMI 32.34 kg/m²     General:  Alert, cooperative, no distress, appears stated age. Head:  Normocephalic, without obvious abnormality, atraumatic. Eyes:  Conjunctivae/corneas clear. PERRL, EOMs intact. Ears:  Normal TMs and external ear canals both ears. Nose: Nares normal. Septum midline. Mucosa normal. No drainage or sinus tenderness. Throat: Lips, mucosa, and tongue normal. Teeth and gums normal.   Neck: Supple, symmetrical, trachea midline, no adenopathy, thyroid: no enlargement/tenderness/nodules, no carotid bruit and no JVD. Back:   Symmetric, no curvature. ROM normal. No CVA tenderness. Lungs:   Clear to auscultation bilaterally. Chest wall:  No tenderness or deformity. Heart:  Regular rate and rhythm, S1, S2, no murmur, click, rub or gallop. Abdomen:   Soft, non-tender. Bowel sounds normal. No masses,  No organomegaly. Extremities: Extremities normal except Shoulder exam reveals palpable radial pulse in both wrists, skin intact bilaterally, sensory exam intact bilaterally. Normal stability bilaterally. Positive Eron deformity on the left, negative on the right. No AC joint pain to palpation bilaterally.   Range of motion right/left:  elevation 160/150, external rotation 70/60.  He has mild pain and weakness with forward elevation on the left compared to the right, and significant weakness and pain on the left with external rotation testing compared to the right.  Good biceps and triceps strength bilaterally. Positive Mazariegos on the left, mildly positive Mazariegos' on the right.     , atraumatic, no cyanosis or edema. Pulses: 2+ and symmetric all extremities. Skin: Skin color, texture, turgor normal. No rashes or lesions   Lymph nodes: Cervical, supraclavicular, and axillary nodes normal.   Neurologic: CNII-XII intact. Neurovascular exam intact in distal extremities        Imaging Review  I independently reviewed and interpreted both the MRI and report of left shoulder from [address] [place] [address] which shows a full thickness rotator cuff tear. The musculotendinous junction is at the level of the glenoid. There is a thin stump left at 12 o'clock.       I independently reviewed and interpreted previous x-rays of LEFT shoulder from 7/10/19 which show proximal humerus migration, no significant glenohumeral osteoarthritis, type II acromion, no fracture    Assessment:     Active Problems:    Traumatic complete tear of left rotator cuff (8/1/2019)        Plan:   Patient has a full thickness left rotator cuff tear with retraction. I discussed the natural history and natural progression of diagnosis.       I reviewed patient's medical record, previous office notes, previous x-rays, MRI, and discussed findings, impression, treatment options, and plan with the patient. Treatment options discussed to include no intervention, prescription strength oral anti-inflammatories, injections, Tylenol, physical therapy, and surgical intervention.      I discussed indications, risks, benefits, and recovery of RCR with patient, emphasizing risk of retear and stiffness, and gave a RCR handout detailing the same.   I reviewed risk factors for reparability and healing to include smoking, diabetes, age over 61, genetic predisposition, chronicity and size of tear, and compliance. I discussed his success rate of 65-70%. I also discussed indications, risks, benefits, and recovery for subacromial decompression. I also discussed indications, risks, benefits, and recovery for reverse TSA.       After discussion and answering questions, it was elected to proceed with surgery as scheduled this Friday - arthroscopic rotator cuff repair, subacromial decompression, possible biceps tenodesis. Medical history was reviewed preop. Operative and non-operative treatments have been discussed with the patient including risks and benefits of each. After consideration of risks, benefits limitations to the consented procedures and alternative options for treatment, the patient has consented to surgical interventions. Questions were answered and Pre-op teaching was completed.       JASVIR Balbuena

## 2019-08-02 ENCOUNTER — HOSPITAL ENCOUNTER (OUTPATIENT)
Age: 69
Setting detail: OUTPATIENT SURGERY
Discharge: HOME | End: 2019-08-02
Attending: ORTHOPAEDIC SURGERY | Admitting: ORTHOPAEDIC SURGERY
Payer: MEDICARE

## 2019-08-02 ENCOUNTER — ANESTHESIA (OUTPATIENT)
Dept: SURGERY | Age: 69
End: 2019-08-02
Payer: MEDICARE

## 2019-08-02 VITALS
OXYGEN SATURATION: 94 % | WEIGHT: 219 LBS | TEMPERATURE: 98.3 F | DIASTOLIC BLOOD PRESSURE: 78 MMHG | SYSTOLIC BLOOD PRESSURE: 150 MMHG | HEART RATE: 84 BPM | HEIGHT: 69 IN | BODY MASS INDEX: 32.44 KG/M2 | RESPIRATION RATE: 17 BRPM

## 2019-08-02 PROCEDURE — 74011250636 HC RX REV CODE- 250/636: Performed by: ORTHOPAEDIC SURGERY

## 2019-08-02 PROCEDURE — 77030008496 HC TBNG ARTHSC IRR S&N -B: Performed by: ORTHOPAEDIC SURGERY

## 2019-08-02 PROCEDURE — C1713 ANCHOR/SCREW BN/BN,TIS/BN: HCPCS | Performed by: ORTHOPAEDIC SURGERY

## 2019-08-02 PROCEDURE — 76060000063 HC AMB SURG ANES 1.5 TO 2 HR: Performed by: ORTHOPAEDIC SURGERY

## 2019-08-02 PROCEDURE — 77030025419 HC BLD SHV ACRMNZR LG S&N -B: Performed by: ORTHOPAEDIC SURGERY

## 2019-08-02 PROCEDURE — 77030026438 HC STYL ET INTUB CARD -A: Performed by: NURSE ANESTHETIST, CERTIFIED REGISTERED

## 2019-08-02 PROCEDURE — 77030037837: Performed by: ORTHOPAEDIC SURGERY

## 2019-08-02 PROCEDURE — 74011000250 HC RX REV CODE- 250: Performed by: NURSE ANESTHETIST, CERTIFIED REGISTERED

## 2019-08-02 PROCEDURE — 77030002916 HC SUT ETHLN J&J -A: Performed by: ORTHOPAEDIC SURGERY

## 2019-08-02 PROCEDURE — 77030004451 HC BUR SHV S&N -B: Performed by: ORTHOPAEDIC SURGERY

## 2019-08-02 PROCEDURE — 76210000050 HC AMBSU PH II REC 0.5 TO 1 HR: Performed by: ORTHOPAEDIC SURGERY

## 2019-08-02 PROCEDURE — 77030018835 HC SOL IRR LR ICUM -A: Performed by: ORTHOPAEDIC SURGERY

## 2019-08-02 PROCEDURE — 77030020274 HC MISC IMPL ORTHOPEDIC: Performed by: ORTHOPAEDIC SURGERY

## 2019-08-02 PROCEDURE — 77030012711 HC WND ARTHRO ABLT S&N -D: Performed by: ORTHOPAEDIC SURGERY

## 2019-08-02 PROCEDURE — 74011250636 HC RX REV CODE- 250/636: Performed by: NURSE ANESTHETIST, CERTIFIED REGISTERED

## 2019-08-02 PROCEDURE — 74011250636 HC RX REV CODE- 250/636: Performed by: ANESTHESIOLOGY

## 2019-08-02 PROCEDURE — 77030003598 HC NDL MULT/FIRE ARTH -C: Performed by: ORTHOPAEDIC SURGERY

## 2019-08-02 PROCEDURE — 77030021352 HC CBL LD SYS DISP COVD -B: Performed by: ORTHOPAEDIC SURGERY

## 2019-08-02 PROCEDURE — 77030020255 HC SOL INJ LR 1000ML BG: Performed by: ORTHOPAEDIC SURGERY

## 2019-08-02 PROCEDURE — 76210000034 HC AMBSU PH I REC 0.5 TO 1 HR: Performed by: ORTHOPAEDIC SURGERY

## 2019-08-02 PROCEDURE — 76030000003 HC AMB SURG OR TIME 1.5 TO 2: Performed by: ORTHOPAEDIC SURGERY

## 2019-08-02 PROCEDURE — 77030037717 HC BIT DRL SUT TAK KT -ARTH -D: Performed by: ORTHOPAEDIC SURGERY

## 2019-08-02 PROCEDURE — 77030002922 HC SUT FBRWRE ARTH -B: Performed by: ORTHOPAEDIC SURGERY

## 2019-08-02 PROCEDURE — 77030008684 HC TU ET CUF COVD -B: Performed by: NURSE ANESTHETIST, CERTIFIED REGISTERED

## 2019-08-02 DEVICE — ANCHOR SUT L19.1MM DIA5.5MM PEEK FULL THRD KNOTLESS EYELET: Type: IMPLANTABLE DEVICE | Site: SHOULDER | Status: FUNCTIONAL

## 2019-08-02 RX ORDER — SODIUM CHLORIDE 0.9 % (FLUSH) 0.9 %
5-40 SYRINGE (ML) INJECTION EVERY 8 HOURS
Status: DISCONTINUED | OUTPATIENT
Start: 2019-08-02 | End: 2019-08-02 | Stop reason: HOSPADM

## 2019-08-02 RX ORDER — SUCCINYLCHOLINE CHLORIDE 20 MG/ML
INJECTION INTRAMUSCULAR; INTRAVENOUS AS NEEDED
Status: DISCONTINUED | OUTPATIENT
Start: 2019-08-02 | End: 2019-08-02 | Stop reason: HOSPADM

## 2019-08-02 RX ORDER — PROPOFOL 10 MG/ML
INJECTION, EMULSION INTRAVENOUS AS NEEDED
Status: DISCONTINUED | OUTPATIENT
Start: 2019-08-02 | End: 2019-08-02 | Stop reason: HOSPADM

## 2019-08-02 RX ORDER — SODIUM CHLORIDE 0.9 % (FLUSH) 0.9 %
5-40 SYRINGE (ML) INJECTION AS NEEDED
Status: DISCONTINUED | OUTPATIENT
Start: 2019-08-02 | End: 2019-08-02 | Stop reason: HOSPADM

## 2019-08-02 RX ORDER — EPHEDRINE SULFATE/0.9% NACL/PF 50 MG/5 ML
SYRINGE (ML) INTRAVENOUS AS NEEDED
Status: DISCONTINUED | OUTPATIENT
Start: 2019-08-02 | End: 2019-08-02 | Stop reason: HOSPADM

## 2019-08-02 RX ORDER — ROPIVACAINE HYDROCHLORIDE 5 MG/ML
INJECTION, SOLUTION EPIDURAL; INFILTRATION; PERINEURAL
Status: COMPLETED | OUTPATIENT
Start: 2019-08-02 | End: 2019-08-02

## 2019-08-02 RX ORDER — LIDOCAINE HYDROCHLORIDE 20 MG/ML
INJECTION, SOLUTION EPIDURAL; INFILTRATION; INTRACAUDAL; PERINEURAL AS NEEDED
Status: DISCONTINUED | OUTPATIENT
Start: 2019-08-02 | End: 2019-08-02 | Stop reason: HOSPADM

## 2019-08-02 RX ORDER — HYDROMORPHONE HYDROCHLORIDE 1 MG/ML
0.2 INJECTION, SOLUTION INTRAMUSCULAR; INTRAVENOUS; SUBCUTANEOUS
Status: DISCONTINUED | OUTPATIENT
Start: 2019-08-02 | End: 2019-08-02 | Stop reason: HOSPADM

## 2019-08-02 RX ORDER — FENTANYL CITRATE 50 UG/ML
INJECTION, SOLUTION INTRAMUSCULAR; INTRAVENOUS AS NEEDED
Status: DISCONTINUED | OUTPATIENT
Start: 2019-08-02 | End: 2019-08-02 | Stop reason: HOSPADM

## 2019-08-02 RX ORDER — NEOSTIGMINE METHYLSULFATE 1 MG/ML
INJECTION, SOLUTION INTRAVENOUS AS NEEDED
Status: DISCONTINUED | OUTPATIENT
Start: 2019-08-02 | End: 2019-08-02 | Stop reason: HOSPADM

## 2019-08-02 RX ORDER — CEFAZOLIN SODIUM/WATER 2 G/20 ML
2 SYRINGE (ML) INTRAVENOUS ONCE
Status: COMPLETED | OUTPATIENT
Start: 2019-08-02 | End: 2019-08-02

## 2019-08-02 RX ORDER — DIPHENHYDRAMINE HYDROCHLORIDE 50 MG/ML
12.5 INJECTION, SOLUTION INTRAMUSCULAR; INTRAVENOUS AS NEEDED
Status: DISCONTINUED | OUTPATIENT
Start: 2019-08-02 | End: 2019-08-02 | Stop reason: HOSPADM

## 2019-08-02 RX ORDER — SODIUM CHLORIDE, SODIUM LACTATE, POTASSIUM CHLORIDE, CALCIUM CHLORIDE 600; 310; 30; 20 MG/100ML; MG/100ML; MG/100ML; MG/100ML
25 INJECTION, SOLUTION INTRAVENOUS CONTINUOUS
Status: DISCONTINUED | OUTPATIENT
Start: 2019-08-02 | End: 2019-08-02 | Stop reason: HOSPADM

## 2019-08-02 RX ORDER — ROPIVACAINE HYDROCHLORIDE 5 MG/ML
INJECTION, SOLUTION EPIDURAL; INFILTRATION; PERINEURAL
Status: COMPLETED
Start: 2019-08-02 | End: 2019-08-02

## 2019-08-02 RX ORDER — FENTANYL CITRATE 50 UG/ML
25 INJECTION, SOLUTION INTRAMUSCULAR; INTRAVENOUS
Status: DISCONTINUED | OUTPATIENT
Start: 2019-08-02 | End: 2019-08-02 | Stop reason: HOSPADM

## 2019-08-02 RX ORDER — DEXAMETHASONE SODIUM PHOSPHATE 4 MG/ML
INJECTION, SOLUTION INTRA-ARTICULAR; INTRALESIONAL; INTRAMUSCULAR; INTRAVENOUS; SOFT TISSUE AS NEEDED
Status: DISCONTINUED | OUTPATIENT
Start: 2019-08-02 | End: 2019-08-02 | Stop reason: HOSPADM

## 2019-08-02 RX ORDER — ROCURONIUM BROMIDE 10 MG/ML
INJECTION, SOLUTION INTRAVENOUS AS NEEDED
Status: DISCONTINUED | OUTPATIENT
Start: 2019-08-02 | End: 2019-08-02 | Stop reason: HOSPADM

## 2019-08-02 RX ORDER — ONDANSETRON 2 MG/ML
INJECTION INTRAMUSCULAR; INTRAVENOUS AS NEEDED
Status: DISCONTINUED | OUTPATIENT
Start: 2019-08-02 | End: 2019-08-02 | Stop reason: HOSPADM

## 2019-08-02 RX ORDER — GLYCOPYRROLATE 0.2 MG/ML
INJECTION INTRAMUSCULAR; INTRAVENOUS AS NEEDED
Status: DISCONTINUED | OUTPATIENT
Start: 2019-08-02 | End: 2019-08-02 | Stop reason: HOSPADM

## 2019-08-02 RX ORDER — LIDOCAINE HYDROCHLORIDE 10 MG/ML
0.1 INJECTION, SOLUTION EPIDURAL; INFILTRATION; INTRACAUDAL; PERINEURAL AS NEEDED
Status: DISCONTINUED | OUTPATIENT
Start: 2019-08-02 | End: 2019-08-02 | Stop reason: HOSPADM

## 2019-08-02 RX ORDER — MIDAZOLAM HYDROCHLORIDE 1 MG/ML
INJECTION, SOLUTION INTRAMUSCULAR; INTRAVENOUS
Status: COMPLETED
Start: 2019-08-02 | End: 2019-08-02

## 2019-08-02 RX ORDER — MIDAZOLAM HYDROCHLORIDE 1 MG/ML
INJECTION, SOLUTION INTRAMUSCULAR; INTRAVENOUS AS NEEDED
Status: DISCONTINUED | OUTPATIENT
Start: 2019-08-02 | End: 2019-08-02 | Stop reason: HOSPADM

## 2019-08-02 RX ORDER — AMOXICILLIN 875 MG/1
500 TABLET, FILM COATED ORAL 3 TIMES DAILY
COMMUNITY
End: 2020-02-24

## 2019-08-02 RX ADMIN — SUCCINYLCHOLINE CHLORIDE 140 MG: 20 INJECTION, SOLUTION INTRAMUSCULAR; INTRAVENOUS at 09:45

## 2019-08-02 RX ADMIN — Medication 2 G: at 09:48

## 2019-08-02 RX ADMIN — Medication 5 MG: at 10:19

## 2019-08-02 RX ADMIN — Medication 5 MG: at 10:32

## 2019-08-02 RX ADMIN — SODIUM CHLORIDE, SODIUM LACTATE, POTASSIUM CHLORIDE, AND CALCIUM CHLORIDE 25 ML/HR: 600; 310; 30; 20 INJECTION, SOLUTION INTRAVENOUS at 07:19

## 2019-08-02 RX ADMIN — PROPOFOL 120 MG: 10 INJECTION, EMULSION INTRAVENOUS at 09:45

## 2019-08-02 RX ADMIN — ROCURONIUM BROMIDE 25 MG: 10 INJECTION INTRAVENOUS at 09:48

## 2019-08-02 RX ADMIN — PROPOFOL 30 MG: 10 INJECTION, EMULSION INTRAVENOUS at 09:48

## 2019-08-02 RX ADMIN — ROPIVACAINE HYDROCHLORIDE 30 ML: 5 INJECTION, SOLUTION EPIDURAL; INFILTRATION; PERINEURAL at 07:51

## 2019-08-02 RX ADMIN — DEXAMETHASONE SODIUM PHOSPHATE 4 MG: 4 INJECTION, SOLUTION INTRAMUSCULAR; INTRAVENOUS at 09:50

## 2019-08-02 RX ADMIN — ONDANSETRON HYDROCHLORIDE 4 MG: 2 INJECTION, SOLUTION INTRAMUSCULAR; INTRAVENOUS at 10:16

## 2019-08-02 RX ADMIN — GLYCOPYRROLATE 0.4 MG: 0.2 INJECTION, SOLUTION INTRAMUSCULAR; INTRAVENOUS at 11:13

## 2019-08-02 RX ADMIN — Medication 3 MG: at 11:13

## 2019-08-02 RX ADMIN — ROCURONIUM BROMIDE 5 MG: 10 INJECTION INTRAVENOUS at 09:45

## 2019-08-02 RX ADMIN — MIDAZOLAM HYDROCHLORIDE 1 MG: 1 INJECTION, SOLUTION INTRAMUSCULAR; INTRAVENOUS at 07:46

## 2019-08-02 RX ADMIN — MIDAZOLAM HYDROCHLORIDE 3 MG: 1 INJECTION, SOLUTION INTRAMUSCULAR; INTRAVENOUS at 07:44

## 2019-08-02 RX ADMIN — LIDOCAINE HYDROCHLORIDE 40 MG: 20 INJECTION, SOLUTION EPIDURAL; INFILTRATION; INTRACAUDAL; PERINEURAL at 09:45

## 2019-08-02 RX ADMIN — FENTANYL CITRATE 100 MCG: 50 INJECTION, SOLUTION INTRAMUSCULAR; INTRAVENOUS at 09:48

## 2019-08-02 NOTE — PERIOP NOTES
1215  Provided relief for lunch. Discharge instructions reviewed with wife. Prescription for oxycodone and zofran given. Questions answered. 1240 Pt up to use restroom. 1250 Pt dressed. Sling on. VSS. Pt meets discharge criteria.

## 2019-08-02 NOTE — BRIEF OP NOTE
BRIEF OPERATIVE NOTE    Date of Procedure: 8/2/2019   Preoperative Diagnosis: ROTATOR CUFF TEAR LEFT SHOULDER  Postoperative Diagnosis: ROTATOR CUFF TEAR LEFT SHOULDER    Procedure(s):  LEFT SHOULDER ARTHROSCOPY, SUBACROMIAL DECOMPRESSION ,ROTATOR CUFF REPAIR, DEBRIDEMENT,BICEPS TENODESIS  Surgeon(s) and Role:     Tha Nick MD - Primary         Surgical Assistant: Ktaerina Barnett PA-C  - Assist     Surgical Staff:  Circ-1: Blessing Esquivel RN  Scrub Tech-1: Nellie Puri  Event Time In Time Out   Incision Start 1000    Incision Close       Anesthesia: General   Estimated Blood Loss: 3cc  Specimens: * No specimens in log *   Findings: see above   Complications: none  Implants:   Implant Name Type Inv.  Item Serial No.  Lot No. LRB No. Used Action   2.6 fibertak anchor   NA  [id number] Left 2 Implanted   2.6 fibertak anchor   NA  [id number] Left 1 Implanted   fibertak anchor   NA  [id number] Left 2 Implanted   ANCHOR SUT CLOS EYE 5.5X19.1MM -- PEEK SWIVELOCK - SNA  ANCHOR SUT CLOS EYE 5.5X19.1MM -- PEEK SWIVELOCK NA ARTHREX [id number] Left 1 Implanted

## 2019-08-02 NOTE — DISCHARGE INSTRUCTIONS
Rotator Cuff Repair  Post-Op Instructions  Jimmie Wakefield M.D.  [phone]    Sling: You will use your sling with pillow immobilizer for five (5) weeks. You may remove your arm from the sling for showers. You may also remove your arm from the sling and let your arm hang down so your elbow doesn't get too stiff. You are encouraged to perform hand, wrist and elbow range of motion, as well as shoulder shrugs 4-5 per day. These exercises will help to decrease swelling and stiffness. You will NOT do any other exercises until I see you back in the office. I will show you what you are and are not allowed to do. The essential point is that you are NOT allowed to lift your elbow away from your side for the first five (5) weeks. Dressing: Your dressing will be left in place for 2 days. You may notice bloody drainage on the dressing. That is fine. We want the fluid out of your shoulder. You will remove the dressing two (2) days after the surgery and cover the incisions with circular band-aids. Shower with band-aids on, then remove and replace band-aids after showers. Sleeping:  Patients are generally more comfortable sleeping in a reclining chair or with some pillows propped behind the shoulder. You should wear the sling when sleeping. Some difficulty with sleeping is common for 2-3 weeks after surgery. Therapy:  Arrangements will be made at your post-op appointment. Your Physical Therapist will progress your activity appropriately. Medications:   -You should resume your daily medications unless instructed differently.    -Do not combine with alcoholic beverages. Do not drive, operate machinery, or make important legal decisions while taking narcotics. -Using the pain medication as directed will help control pain with little risk of complication.    -Do not wait until you are in a lot of pain before taking the medication.   It takes the medication 30-45 minutes to take effect.    -Do not take additional Tylenol (Acetaminophen) in combination with pain medications like Norco or Percocet. You may find the contents of the pain medication on the bottle of your prescription.    -You may be given two (2) narcotic pain medications. Use one or the other, not both at the same time. Take *** for severe pain, or *** for moderate pain.      -It is not uncommon to have some stomach upset with the use of narcotic medication. For this reason, take you medication with food. If your symptoms are severe, please call the office.    -The use of narcotics can lead to constipation. A high fiber diet, and lots of fluids can prevent this occurring. Over the counter laxatives (milk of magnesia, dulcolax, magnesium citrate) can be used as directed. -If a refill of medication is needed, please call the office during regular business hours, Monday - Friday 8:00AM-5:00PM, refills will not be made after hours, so please plan ahead. Driving: Driving or operating machinery while under the influence of narcotic pain medication is dangerous, illegal, and greatly discouraged in all patients. I would not count on being able to drive for a minimum of one (1) week. Do not operate a manual transmission until cleared by our office. After that, use your own judgement,  Be safe. Return to school/work: This will depend on your job requirements and level of activity. If you work at a desk job or in a supervisory role, you may return as early as 3-4 days after surgery. Average return to regular activities is four (4) months post-op, however, if your job requires lifting or manual labor, your restrictions may extend longer. Follow up: You will be given an appointment card for your follow-up appointment, at that time your sutures will be removed and physical therapy will be arranged. If unexpected problems, emergencies or other issues occur and you need to speak with our office, please call.   A physician is on call 24 hours a day, 7 days a week for emergencies and may be reached through the answering service by calling the regular office number [phone]. Parth Powers M.D.       Shital Castro, [address], [address] [address] [facility] TAKE NARCOTIC PAIN MEDICATIONS WITH FOOD! For the night of surgery, while block is still in effect, start with 1 pain pill at bedtime    Narcotics tend to be constipating and we recommend taking a stool softener such as Colace or Miralax (follow package instructions). If you were given prescriptions, please review the written information on the prescribed medications. DO NOT DRIVE WHILE TAKING NARCOTIC PAIN MEDICATIONS. CPAP PATIENTS BE SURE TO WEAR MACHINE WHENEVER NAPPING OR SLEEPING DAY/NIGHT OF SURGERY! DISCHARGE SUMMARY from Nurse    The following personal items collected during your admission are returned to you:   Dental Appliance: Dental Appliances: None  Vision: Visual Aid: Glasses(Given to wife)  Hearing Aid:    Jewelry: Jewelry: Watch(Given to wife)  Clothing: Clothing: With patient  Other Valuables: Other Valuables: Carl Matamoros, Cell Phone, Eyeglasses(Given to wife)  Valuables sent to safe:        PATIENT INSTRUCTIONS:    After General Anesthesia or Intravenous Sedation, for 24 hours or while taking prescription Narcotics:  · Someone should be with you for the next 24 hours. · For your own safety, a responsible adult must drive you home. · Limit your activities  · Recommended activity: Rest today, up with assistance today. Do not climb stairs or shower unattended for the next 24 hours. · Start with a soft bland diet and advance as tolerated (no nausea) to regular diet. · If you have a sore throat some things that may help are: fluids, warm salt water gargle, or throat lozenges. If this does not improve after several days please follow up with your family physician.   · Do not drive and operate hazardous machinery  · Do not make important personal or business decisions  · Do  not drink alcoholic beverages  · If you have not urinated within 8 hours after discharge, please contact your surgeon on call. Report the following to your surgeon:  · Excessive pain, swelling, redness or odor of or around the surgical area  · Temperature over 100.5  · Nausea and vomiting lasting longer than 4 hours or if unable to take medications  · Any signs of decreased circulation or nerve impairment to extremity: change in color, persistent  numbness, tingling, coldness or increase pain    · If you received an upper extremity nerve block, please wear your sling until the block has worn off, then refer to your surgeons post-operative instructions. If you have had a shoulder block or a block near your collar bone, you may have2 symptoms such as:          1. Mild shortness of breath        2. A hoarse voice        3. Blurry vision        4. Unequal pupils        5. Drooping of your face on the same side as the nerve block. These symptoms will disappear as the nerve block wears off. · You will receive a Post Operative Call from one of the Recovery Room Nurses on the day after your surgery to check on you. It is very important for us to know how you are recovering after your surgery. If you have an issue please call your surgeon, do not wait for the post operative call. · You may receive an e-mail or letter in the mail from [place] regarding your experience with us in the Ambulatory Surgery Unit. Your feedback is valuable to us and we appreciate your participation in the survey. ·   · If the above instructions are not adequate or you are having problems after your surgery, call your physician at their office number. ·   · We wish youre a speedy recovery ? What to do at Home:    *  Please give a list of your current medications to your Primary Care Provider.     *  Please update this list whenever your medications are discontinued, doses are changed, or new medications (including over-the-counter products) are added. *  Please carry medication information at all times in case of emergency situations. If you have not had your influenza or pneumococcal vaccines, please follow up with your primary care physician. The discharge information has been reviewed with the patient and caregiver. The patient and caregiver verbalized understanding.

## 2019-08-02 NOTE — PERIOP NOTES
JASVIR Sheets & Dr. Ajith Joy are all aware and are all okay that patient is being treated for a tooth infection. Patient was seen at Dentist yesterday and was prescribed Amoxicillin 500 mg, tid. Patient has had a total of three doses of the Amoxicillin already. Surgery will proceed.

## 2019-08-02 NOTE — ANESTHESIA PROCEDURE NOTES
Left Interscalene Block    Performed by: Blanca Delacruz MD  Authorized by: Blanca Delacruz MD       Pre-procedure: Indications: at surgeon's request and post-op pain management    Preanesthetic Checklist: patient identified, risks and benefits discussed, site marked, timeout performed, anesthesia consent given and patient being monitored      Block Type:   Block Type:   Interscalene  Laterality:  Left  Monitoring:  Standard ASA monitoring, continuous pulse ox, frequent vital sign checks, heart rate, responsive to questions and oxygen  Injection Technique:  Single shot  Procedures: ultrasound guided and nerve stimulator    Patient Position: supine  Prep: DuraPrep    Location:  Interscalene  Needle Type:  Stimuplex  Needle Gauge:  22 G  Needle Localization:  Nerve stimulator and ultrasound guidance  Motor Response: minimal motor response >0.4 mA      Assessment:  Number of attempts:  1  Injection Assessment:  Incremental injection every 5 mL, negative aspiration for CSF, negative aspiration for blood, no paresthesia and no intravascular symptoms  Patient tolerance:  Patient tolerated the procedure well with no immediate complications

## 2019-08-02 NOTE — PERIOP NOTES
Margy Diaz  1950  [id number]    Situation:  Verbal report given from: [facility]  Procedure: Procedure(s):  LEFT SHOULDER ARTHROSCOPY, SUBACROMIAL DECOMPRESSION ,ROTATOR CUFF REPAIR, DEBRIDEMENT,BICEPS TENODESIS    Background:    Preoperative diagnosis: ROTATOR CUFF TEAR LEFT SHOULDER    Postoperative diagnosis: ROTATOR CUFF TEAR LEFT SHOULDER    :  Dr. Jeniffer Yu    Assistant(s): Circ-1: Lee Bejarano RN  Scrub Tech-1: Cassandra Pack    Specimens: * No specimens in log *    Assessment:  Intra-procedure medications         Anesthesia gave intra-procedure sedation and medications, see anesthesia flow sheet     Intravenous fluids: LR@ KVO     Vital signs stable       Recommendation:    Permission to share finding with wife Ivanna Thompsonas

## 2019-08-02 NOTE — ANESTHESIA POSTPROCEDURE EVALUATION
Procedure(s):  LEFT SHOULDER ARTHROSCOPY, SUBACROMIAL DECOMPRESSION ,ROTATOR CUFF REPAIR, DEBRIDEMENT,BICEPS TENODESIS. general    Anesthesia Post Evaluation        Patient location during evaluation: PACU  Note status: Adequate. Level of consciousness: responsive to verbal stimuli and sleepy but conscious  Pain management: satisfactory to patient  Airway patency: patent  Anesthetic complications: no  Cardiovascular status: acceptable  Respiratory status: acceptable  Hydration status: acceptable  Comments: +Post-Anesthesia Evaluation and Assessment    Patient: Margy Diaz MRN: [id number]  SSN: [id number]   YOB: 1950  Age: 71 y.o. Sex: male      Cardiovascular Function/Vital Signs    /75   Pulse 86   Temp 36.8 °C (98.3 °F)   Resp 15   Ht 5' 9\" (1.753 m)   Wt 99.3 kg (219 lb)   SpO2 96%   BMI 32.34 kg/m²     Patient is status post Procedure(s):  LEFT SHOULDER ARTHROSCOPY, SUBACROMIAL DECOMPRESSION ,ROTATOR CUFF REPAIR, DEBRIDEMENT,BICEPS TENODESIS. Nausea/Vomiting: Controlled. Postoperative hydration reviewed and adequate. Pain:  Pain Scale 1: FLACC (08/02/19 1145)  Pain Intensity 1: 0 (08/02/19 1136)   Managed. Neurological Status:   Neuro (WDL): Exceptions to WDL (08/02/19 1131)   At baseline. Mental Status and Level of Consciousness: Arousable. Pulmonary Status:   O2 Device: Nasal cannula (08/02/19 1131)   Adequate oxygenation and airway patent. Complications related to anesthesia: None    Post-anesthesia assessment completed. No concerns.     Signed By: Yumiko Newell MD    8/2/2019  Post anesthesia nausea and vomiting:  controlled      Vitals Value Taken Time   /75 8/2/2019 12:00 PM   Temp 36.8 °C (98.3 °F) 8/2/2019 11:31 AM   Pulse 86 8/2/2019 12:00 PM   Resp 15 8/2/2019 12:00 PM   SpO2 96 % 8/2/2019 11:45 AM

## 2019-08-02 NOTE — PERIOP NOTES
Permission received to review discharge instructions and discuss private health information with wife, Vira Cooks. Patient states that wife will be with them for at least 24 hours following today's procedure. Beth Paws applied at this time and set to appropriate temperature. Patient given remote and instructed on use. Will continue to monitor.

## 2019-08-02 NOTE — PERIOP NOTES
Dr. Leeanna Swann performed a LUE block with use of nerve stimulator. Patient on continuous cardiac and pulse oximetry monitoring throughout the procedure, nasal cannula 3 liters. Patient received 4 mg, Versed, administered by Dr. Leeanna Swann. Vital signs stable. Patient tolerated procedure well. Patient drowsy but arouses when spoken to. Will continue to monitor.

## 2019-08-03 NOTE — OP NOTES
[address]  OPERATIVE REPORT    Name:  Treva Jerez  MR#:  [id number]  :  1950  ACCOUNT #:  [de-identified]  DATE OF SERVICE:  2019      PREOPERATIVE DIAGNOSES:  Left massive rotator cuff tear, supraspinatus and infraspinatus, long head biceps tear with instability at the pulley, outlet impingement. POSTOPERATIVE DIAGNOSES:  Left massive rotator cuff tear, supraspinatus and infraspinatus, long head biceps tear with instability at the pulley, outlet impingement. PROCEDURES PERFORMED:  Left shoulder arthroscopic rotator cuff repair x2, arthroscopic long head biceps tenodesis, extensive debridement of biceps tendon stump and extensive debridement of rotator interval and release of rotator cuff adhesions, arthroscopic subacromial decompression. SURGEON:  Eduardo Villarreal MD    ASSISTANT:  JASVIR Chapa    ANESTHESIA:  General endotracheal.    COMPLICATIONS:  None. SPECIMENS REMOVED:  none    IMPLANTS:  Arthrex anchors x6. ESTIMATED BLOOD LOSS:  5 mL. INDICATION:  The patient has a chronic retracted rotator cuff tear, comes in for attempted surgical repair. This is a complex surgery procedure requiring an assistant for patient positioning, wound closure, but critically for what is a four-handed operation requiring an assistant for suture management, cannula stabilization, and to hold the arthroscope while anchors are placed, while sutures are placed, and while sutures are tied and one is used. PROCEDURE:  The patient was brought into the operating room theater, given airway, IV antibiotics, and preoperative interscalene block, rolled over in the left side up lateral position. Beanbag exsufflated downside. Axillary roll placed downside. Peroneal nerve padded. Neck placed in neutral extension position on a folded pillow. Left shoulder prepped and draped, put in 10 pounds of traction, 30 degrees of abduction, and 20 degrees of forward flexion.   Established anterior and posterior arthroscopic portals. Reviewed the joint in its entirety. Glenohumeral joint shows no arthritis. The subscap is intact. No labral tearing. The biceps was torn at the pulley due to lateral instability from this longstanding two-tendon supraspinatus-infraspinatus rotator cuff tear. Went into the subacromial space and did a thorough bursectomy. The patient has outlet impingement, so he underwent lateral half CA ligament release and subacromial decompression, primarily removing lateral facet. The bursa was excised. His supraspinatus and infraspinatus were retracted to the glenoid rim, so we put traction stitch in the cuff and started doing capsular releases above and below as well as extensively debriding the rotator interval to allow for the tendon to have excursion enough to get out to the tuberosity. We then roughened up the tuberosity and put in 3 medial row Arthrex all suture anchors and 2 lateral row all suture anchors. The 3 medial row sutures, in each case, one suture was deployed using the Scorpion through the PassPort to capture both the capsular articular layer and the dorsal layer exiting 15 mm medial to the lateral edge and 1 cm apart anterior to posterior. The front one encapsulated the rolled edge of the supra, the back one the rolled edge of the infra, and the middle one the intervening tissue, so those 6 sutures were taken out of the way. Then, we put in 2 triple-loaded anchors laterally and put in 6 simple sutures laterally, then put a whipstitch around the proximal biceps using a Childs weave, using the Scorpion through the PassPort with locking stitches at the level of the pulley and brought the sutures out the transverse bicipital ligament and tied it down and then resected the proximal biceps using the ArthroCare wand and a sucker shaver.   Once the tenodesis was completed, then we tied down the lateral row sutures first using arthroscopic knot tying technique, then the medial row sutures. Then, we took the 6 strands of medial row sutures and draped them over the lateral row into a  lateral SwiveLock Arthrex anchor to stabilize this massive tear. Took pictures, copiously irrigated, closed the portals, took the patient to recovery room in stable condition.       Guzman Chaparro MD      [id number]  D:  08/02/2019 11:18  T:  08/02/2019 12:57  JOB #:  [id number]

## 2019-10-26 NOTE — ANESTHESIA POSTPROCEDURE EVALUATION
Post-Anesthesia Evaluation and Assessment    Patient: Clinton Renae MRN: [id number]  SSN: [id number]    YOB: 1950  Age: 79 y.o. Sex: male       Cardiovascular Function/Vital Signs  Visit Vitals    /74    Pulse 90    Temp 36.5 °C (97.7 °F)    Resp 11    Ht 5' 9\" (1.753 m)    Wt 103.7 kg (228 lb 9.9 oz)    SpO2 94%    BMI 33.76 kg/m2       Patient is status post general anesthesia for Procedure(s):  L4-L5 LATERAL POSTERIOR DECOMPRESSION AND FUSION (germán munguia). Nausea/Vomiting: None    Postoperative hydration reviewed and adequate. Pain:  Pain Scale 1: Numeric (0 - 10) (07/23/18 2030)  Pain Intensity 1: 5 (07/23/18 2030)   Managed    Neurological Status:   Neuro (WDL): Within Defined Limits (07/23/18 1946)  Neuro  Neurologic State: Alert (07/23/18 1451)  Orientation Level: Appropriate for age (07/23/18 1451)  Cognition: Appropriate for age attention/concentration; Appropriate decision making (07/23/18 1451)  Speech: Appropriate for age (07/23/18 1451)  Assessment L Pupil: Deferred (07/23/18 1451)  Assessment R Pupil: Deferred (07/23/18 1451)  LUE Motor Response: Purposeful (07/23/18 1451)  LLE Motor Response: Purposeful;Tingling;Numbness (07/23/18 1451)  RUE Motor Response: Purposeful (07/23/18 1451)  RLE Motor Response: Purposeful;Tingling;Numbness (right is worse than the left with the tingling and nu mbness ) (07/23/18 1451)   At baseline    Mental Status and Level of Consciousness: Arousable    Pulmonary Status:   O2 Device: Nasal cannula (07/23/18 2015)   Adequate oxygenation and airway patent    Complications related to anesthesia: None    Post-anesthesia assessment completed.  No concerns    Signed By: Sandee Solis MD     July 23, 2018
normal (ped)...

## 2020-02-25 ENCOUNTER — ANESTHESIA EVENT (OUTPATIENT)
Dept: ENDOSCOPY | Age: 70
End: 2020-02-25
Payer: MEDICARE

## 2020-02-25 NOTE — ANESTHESIA PREPROCEDURE EVALUATION
Anesthetic History   No history of anesthetic complications            Review of Systems / Medical History  Patient summary reviewed, nursing notes reviewed and pertinent labs reviewed    Pulmonary  Within defined limits                 Neuro/Psych   Within defined limits          Comments: S/P Lateral L4-L5 Decompression and Fusion (7/23/18)  Neurogenic Claudication Cardiovascular    Hypertension: well controlled              Exercise tolerance: >4 METS  Comments: 2017 ECHO:  55-60% EF with no valve issues   GI/Hepatic/Renal     GERD: well controlled      Hiatal hernia    Comments: Celiac Disease Endo/Other        Obesity and arthritis     Other Findings   Comments:   Screening colonoscopy  Celiac disease  Spinal stenosis         Physical Exam    Airway  Mallampati: III  TM Distance: 4 - 6 cm  Neck ROM: normal range of motion   Mouth opening: Normal     Cardiovascular  Regular rate and rhythm,  S1 and S2 normal,  no murmur, click, rub, or gallop  Rhythm: regular  Rate: normal         Dental    Dentition: Lower partial plate and Poor dentition     Pulmonary  Breath sounds clear to auscultation               Abdominal  GI exam deferred       Other Findings            Anesthetic Plan    ASA: 2  Anesthesia type: total IV anesthesia          Induction: Intravenous  Anesthetic plan and risks discussed with: Patient      Took all his meds on schedule this morning

## 2020-02-26 ENCOUNTER — ANESTHESIA (OUTPATIENT)
Dept: ENDOSCOPY | Age: 70
End: 2020-02-26
Payer: MEDICARE

## 2020-02-26 ENCOUNTER — HOSPITAL ENCOUNTER (OUTPATIENT)
Age: 70
Setting detail: OUTPATIENT SURGERY
Discharge: HOME | End: 2020-02-26
Attending: INTERNAL MEDICINE | Admitting: INTERNAL MEDICINE
Payer: MEDICARE

## 2020-02-26 VITALS
BODY MASS INDEX: 33.41 KG/M2 | DIASTOLIC BLOOD PRESSURE: 81 MMHG | SYSTOLIC BLOOD PRESSURE: 138 MMHG | RESPIRATION RATE: 14 BRPM | TEMPERATURE: 97.6 F | WEIGHT: 225.56 LBS | HEART RATE: 71 BPM | HEIGHT: 69 IN | OXYGEN SATURATION: 97 %

## 2020-02-26 PROCEDURE — 76040000019: Performed by: INTERNAL MEDICINE

## 2020-02-26 PROCEDURE — 74011000250 HC RX REV CODE- 250: Performed by: NURSE ANESTHETIST, CERTIFIED REGISTERED

## 2020-02-26 PROCEDURE — 74011250636 HC RX REV CODE- 250/636: Performed by: INTERNAL MEDICINE

## 2020-02-26 PROCEDURE — 76060000031 HC ANESTHESIA FIRST 0.5 HR: Performed by: INTERNAL MEDICINE

## 2020-02-26 PROCEDURE — 74011250636 HC RX REV CODE- 250/636: Performed by: NURSE ANESTHETIST, CERTIFIED REGISTERED

## 2020-02-26 RX ORDER — DEXTROMETHORPHAN/PSEUDOEPHED 2.5-7.5/.8
1.2 DROPS ORAL
Status: DISCONTINUED | OUTPATIENT
Start: 2020-02-26 | End: 2020-02-26 | Stop reason: HOSPADM

## 2020-02-26 RX ORDER — FLUMAZENIL 0.1 MG/ML
0.2 INJECTION INTRAVENOUS
Status: DISCONTINUED | OUTPATIENT
Start: 2020-02-26 | End: 2020-02-26 | Stop reason: HOSPADM

## 2020-02-26 RX ORDER — ATROPINE SULFATE 0.1 MG/ML
0.5 INJECTION INTRAVENOUS
Status: DISCONTINUED | OUTPATIENT
Start: 2020-02-26 | End: 2020-02-26 | Stop reason: HOSPADM

## 2020-02-26 RX ORDER — MIDAZOLAM HYDROCHLORIDE 1 MG/ML
.25-5 INJECTION, SOLUTION INTRAMUSCULAR; INTRAVENOUS
Status: DISCONTINUED | OUTPATIENT
Start: 2020-02-26 | End: 2020-02-26 | Stop reason: HOSPADM

## 2020-02-26 RX ORDER — LIDOCAINE HYDROCHLORIDE 20 MG/ML
INJECTION, SOLUTION EPIDURAL; INFILTRATION; INTRACAUDAL; PERINEURAL AS NEEDED
Status: DISCONTINUED | OUTPATIENT
Start: 2020-02-26 | End: 2020-02-26 | Stop reason: HOSPADM

## 2020-02-26 RX ORDER — SODIUM CHLORIDE 0.9 % (FLUSH) 0.9 %
5-40 SYRINGE (ML) INJECTION AS NEEDED
Status: DISCONTINUED | OUTPATIENT
Start: 2020-02-26 | End: 2020-02-26 | Stop reason: HOSPADM

## 2020-02-26 RX ORDER — EPINEPHRINE 0.1 MG/ML
1 INJECTION INTRACARDIAC; INTRAVENOUS
Status: DISCONTINUED | OUTPATIENT
Start: 2020-02-26 | End: 2020-02-26 | Stop reason: HOSPADM

## 2020-02-26 RX ORDER — SODIUM CHLORIDE 9 MG/ML
75 INJECTION, SOLUTION INTRAVENOUS CONTINUOUS
Status: DISCONTINUED | OUTPATIENT
Start: 2020-02-26 | End: 2020-02-26 | Stop reason: HOSPADM

## 2020-02-26 RX ORDER — SODIUM CHLORIDE 0.9 % (FLUSH) 0.9 %
5-40 SYRINGE (ML) INJECTION EVERY 8 HOURS
Status: DISCONTINUED | OUTPATIENT
Start: 2020-02-26 | End: 2020-02-26 | Stop reason: HOSPADM

## 2020-02-26 RX ORDER — NALOXONE HYDROCHLORIDE 0.4 MG/ML
0.4 INJECTION, SOLUTION INTRAMUSCULAR; INTRAVENOUS; SUBCUTANEOUS
Status: DISCONTINUED | OUTPATIENT
Start: 2020-02-26 | End: 2020-02-26 | Stop reason: HOSPADM

## 2020-02-26 RX ORDER — PROPOFOL 10 MG/ML
INJECTION, EMULSION INTRAVENOUS AS NEEDED
Status: DISCONTINUED | OUTPATIENT
Start: 2020-02-26 | End: 2020-02-26 | Stop reason: HOSPADM

## 2020-02-26 RX ADMIN — PROPOFOL 100 MG: 10 INJECTION, EMULSION INTRAVENOUS at 10:49

## 2020-02-26 RX ADMIN — PROPOFOL 50 MG: 10 INJECTION, EMULSION INTRAVENOUS at 11:02

## 2020-02-26 RX ADMIN — LIDOCAINE HYDROCHLORIDE 80 MG: 20 INJECTION, SOLUTION EPIDURAL; INFILTRATION; INTRACAUDAL; PERINEURAL at 10:49

## 2020-02-26 RX ADMIN — PROPOFOL 50 MG: 10 INJECTION, EMULSION INTRAVENOUS at 10:58

## 2020-02-26 RX ADMIN — SODIUM CHLORIDE 75 ML/HR: 900 INJECTION, SOLUTION INTRAVENOUS at 10:03

## 2020-02-26 RX ADMIN — PROPOFOL 50 MG: 10 INJECTION, EMULSION INTRAVENOUS at 10:54

## 2020-02-26 NOTE — DISCHARGE INSTRUCTIONS
[place] Office: [phone]    Laura Gant  [id number]  1950    EGD/COLONOSCOPY DISCHARGE INSTRUCTIONS  Discomfort:  Sore throat- throat lozenges or warm salt water gargle  redness at IV site- apply warm compress to area; if redness or soreness persist- contact your physician  Gaseous discomfort- walking, belching will help relieve any discomfort  You may not operate a vehicle for 12 hours  You may not engage in an occupation involving machinery or appliances for rest of today. You may not drink alcoholic beverages for at least 12 hours  Avoid making any critical decisions for at least 24 hour  DIET  You may resume your regular diet - however -  remember your colon is empty and a heavy meal will produce gas. Avoid these foods:  fried / greasy foods, excessive carbonated drinks or too much caffeine  MEDICATIONS   Regarding Aspirin or Nonsteroidal medications specifically, please see below. ACTIVITY  You may resume your normal daily activities. Spend the remainder of the day resting -  avoid any strenuous activity. CALL M.D. ANY SIGN OF   Increasing pain, nausea, vomiting  Abdominal distension (swelling)  New increased bleeding (oral or rectal)  Fever (chills)  Pain in chest area  Bloody discharge from nose or mouth  Shortness of breath    You may  take any Advil, Aspirin, Ibuprofen, Motrin, Aleve, or   Tylenol as needed for pain. Follow-up Instructions:   Call  Mubashir A. Dayton Mortimer, MD for any questions or concerns     Telephone # [phone]      Follow-up Information    None

## 2020-02-26 NOTE — PROGRESS NOTES
Kanchan Brodie  1950  [id number]    Situation:  Verbal report received from: [facility] RN  Procedure: Procedure(s):  COLONOSCOPY    Background:    Preoperative diagnosis: SCREENING COLONOSCOPY  Postoperative diagnosis: Diverticulosis, hemorrhoids, inadequate colon prep    :  Dr. Izabella Jacome  Assistant(s): Endoscopy RN-1: Candida Hernandez RN    Specimens: * No specimens in log *  H. Pylori  yes    Assessment:  Intra-procedure medications   Anesthesia gave intra-procedure sedation and medications, see anesthesia flow sheet yes    Intravenous fluids: NS@ KVO     Vital signs stable    Abdominal assessment: round and soft    Recommendation:  Discharge patient per MD order.   Return to floor  Family- wife  Permission to share finding with family or friend no

## 2020-02-26 NOTE — ANESTHESIA POSTPROCEDURE EVALUATION
Procedure(s):  COLONOSCOPY.    total IV anesthesia    Anesthesia Post Evaluation        Patient location during evaluation: PACU  Note status: Adequate. Level of consciousness: responsive to verbal stimuli and sleepy but conscious  Pain management: satisfactory to patient  Airway patency: patent  Anesthetic complications: no  Cardiovascular status: acceptable  Respiratory status: acceptable  Hydration status: acceptable  Comments: +Post-Anesthesia Evaluation and Assessment    Patient: Kalee Casey MRN: [id number]  SSN: [id number]   YOB: 1950  Age: 71 y.o. Sex: male      Cardiovascular Function/Vital Signs    /81   Pulse 71   Temp 36.4 °C (97.6 °F)   Resp 14   Ht 5' 9\" (1.753 m)   Wt 102.3 kg (225 lb 9 oz)   SpO2 97%   BMI 33.31 kg/m²     Patient is status post Procedure(s):  COLONOSCOPY. Nausea/Vomiting: Controlled. Postoperative hydration reviewed and adequate. Pain:  Pain Scale 1: Numeric (0 - 10) (02/26/20 1124)  Pain Intensity 1: 0 (02/26/20 1124)   Managed. Neurological Status: At baseline. Mental Status and Level of Consciousness: Arousable. Pulmonary Status:   O2 Device: Room air (02/26/20 1128)   Adequate oxygenation and airway patent. Complications related to anesthesia: None    Post-anesthesia assessment completed. No concerns. Signed By: Armani Henderson MD    2/26/2020  Post anesthesia nausea and vomiting:  controlled      Vitals Value Taken Time   /81 2/26/2020 11:30 AM   Temp 36.4 °C (97.6 °F) 2/26/2020 11:13 AM   Pulse 69 2/26/2020 11:30 AM   Resp 12 2/26/2020 11:30 AM   SpO2 96 % 2/26/2020 11:30 AM   Vitals shown include unvalidated device data.

## 2020-02-26 NOTE — H&P
Pre-endoscopy H and P    The patient was seen and examined in the room/pre-op holding area. The airway was assessed and documented. The problem list, past medical history, and medications were reviewed.      Patient Active Problem List   Diagnosis Code    Dislocation closed, ankle left subtalar      GERD (gastroesophageal reflux disease) K21.9    Celiac disease K90.0    HTN (hypertension) I10    Ulcer FII2683    Knee osteoarthritis M17.10    Spinal stenosis of lumbar region with neurogenic claudication M48.062    Spondylisthesis M43.10    Traumatic complete tear of left rotator cuff S46.012A     Social History     Socioeconomic History    Marital status:      Spouse name: Not on file    Number of children: Not on file    Years of education: Not on file    Highest education level: Not on file   Occupational History    Not on file   Social Needs    Financial resource strain: Not on file    Food insecurity:     Worry: Not on file     Inability: Not on file    Transportation needs:     Medical: Not on file     Non-medical: Not on file   Tobacco Use    Smoking status: Never Smoker    Smokeless tobacco: Never Used   Substance and Sexual Activity    Alcohol use: No    Drug use: Yes     Types: Prescription, OTC    Sexual activity: Not on file   Lifestyle    Physical activity:     Days per week: Not on file     Minutes per session: Not on file    Stress: Not on file   Relationships    Social connections:     Talks on phone: Not on file     Gets together: Not on file     Attends Rastafari service: Not on file     Active member of club or organization: Not on file     Attends meetings of clubs or organizations: Not on file     Relationship status: Not on file    Intimate partner violence:     Fear of current or ex partner: Not on file     Emotionally abused: Not on file     Physically abused: Not on file     Forced sexual activity: Not on file   Other Topics Concern    Not on file   Social History Narrative    Not on file     Past Medical History:   Diagnosis Date    Arthritis     right knee    Autoimmune disease ([facility] [address]     Celiac disease    Celiac disease     Gastrointestinal disorder     acid reflux    GERD (gastroesophageal reflux disease)     Hiatal hernia     Hypertension     Ill-defined condition     hiatal hernia    Ill-defined condition     ruptured petalla rt    Other unknown and unspecified cause of morbidity or mortality     nasal polyps         Prior to Admission Medications   Prescriptions Last Dose Informant Patient Reported? Taking? RABEprazole (ACIPHEX) 20 mg tablet 2020 at Unknown time  Yes Yes   Sig: Take 20 mg by mouth every morning. acetaminophen (TYLENOL PO) 2020 at Unknown time  Yes Yes   Sig: Take 500 mg by mouth. 2 tabs every 8 hours   amlodipine (NORVASC) 5 mg tablet 2020 at Unknown time  Yes Yes   Sig: Take 5 mg by mouth every morning. Indications: HYPERTENSION   cholecalciferol, vitamin D3, 2,000 unit tab 2020 at Unknown time  Yes Yes   Sig: Take 1 Tab by mouth daily. etodolac (LODINE) 400 mg tablet 2020 at Unknown time  Yes Yes   Sig: Take 400 mg by mouth two (2) times daily (with meals). gabapentin (NEURONTIN) 300 mg capsule 2020 at Unknown time  Yes Yes   Sig: Take 300 mg by mouth three (3) times daily. glucos sul 2KCl/msm/chond/C/Mn (GLUCOSAMINE CHONDROITIN PO) 2020 at Unknown time  Yes Yes   Sig: Take 1 Tab by mouth daily. losartan (COZAAR) 50 mg tablet 2020 at Unknown time  Yes Yes   Sig: Take 50 mg by mouth every morning.   multivitamin (ONE A DAY) tablet 2020 at Unknown time  Yes Yes   Sig: Take 1 Tab by mouth daily. sodium chloride (SALINE MIST NA) Not Taking at Unknown time  Yes No   Si Spray by Nasal route as needed (NASAL DRYNESS). tiZANidine (ZANAFLEX) 4 mg tablet 2020 at Unknown time  Yes Yes   Sig: Take 4 mg by mouth every six (6) hours as needed (Muscle Spasms). Facility-Administered Medications: None           The review of systems is:  Negative  for shortness of breath or chest pain      The heart, lungs, and mental status were satisfactory for the administration of deep sedation and for the procedure. I discussed with the patient the objectives, risks, consequences and alternatives to the procedure.       Lowell Bermudez MD  2/26/2020  10:44 AM

## 2020-02-26 NOTE — PERIOP NOTES
Endoscope was pre-cleaned at the bedside immediately following procedure by [facility]. Anesthesia reports 250mg Propofol, 80mg Lidocaine and 100mL NS given during procedure. Received report from anesthesia staff on vital signs and status of patient. Glasses returned to patient.

## 2020-07-17 ENCOUNTER — HOSPITAL ENCOUNTER (OUTPATIENT)
Dept: MRI IMAGING | Age: 70
Discharge: HOME | End: 2020-07-17
Attending: ORTHOPAEDIC SURGERY
Payer: MEDICARE

## 2020-07-17 DIAGNOSIS — M54.32 BILATERAL SCIATICA: ICD-10-CM

## 2020-07-17 DIAGNOSIS — M54.31 BILATERAL SCIATICA: ICD-10-CM

## 2020-07-17 DIAGNOSIS — Z98.1 S/P SPINAL FUSION: ICD-10-CM

## 2020-07-17 DIAGNOSIS — M54.50 LOW BACK PAIN, UNSPECIFIED BACK PAIN LATERALITY, UNSPECIFIED CHRONICITY, UNSPECIFIED WHETHER SCIATICA PRESENT: ICD-10-CM

## 2020-07-17 PROCEDURE — 74011250636 HC RX REV CODE- 250/636: Performed by: ORTHOPAEDIC SURGERY

## 2020-07-17 PROCEDURE — 72158 MRI LUMBAR SPINE W/O & W/DYE: CPT

## 2020-07-17 PROCEDURE — A9575 INJ GADOTERATE MEGLUMI 0.1ML: HCPCS | Performed by: ORTHOPAEDIC SURGERY

## 2020-07-17 RX ORDER — GADOTERATE MEGLUMINE 376.9 MG/ML
20 INJECTION INTRAVENOUS
Status: COMPLETED | OUTPATIENT
Start: 2020-07-17 | End: 2020-07-17

## 2020-07-17 RX ADMIN — GADOTERATE MEGLUMINE 19 ML: 376.9 INJECTION INTRAVENOUS at 17:39

## 2020-11-06 NOTE — PERIOP NOTES
Incentive Chadwick Mccullough Using the incentive spirometer helps expand the small air sacs of your lungs, helps you breathe deeply, and helps improve your lung function. Use your incentive spirometer twice a day (10 breaths each time) prior to surgery. How to Use Your Incentive Spirometer: 1. Hold the incentive spirometer in an upright position. 2. Breathe out as usual.  
3. Place the mouthpiece in your mouth and seal your lips tightly around it. 4. Take a deep breath. Breathe in slowly and as deeply as possible. Keep the blue flow rate guide between the arrows. 5. Hold your breath as long as possible. Then exhale slowly and allow the piston to fall to the bottom of the column. 6. Rest for a few seconds and repeat steps one through five at least 10 times. PAT Tidal Volume____2500______________  x_____2___________  Date______7/11/18_________________ BRING THE INCENTIVE SPIROMETER WITH YOU TO THE HOSPITAL ON THE DAY OF YOUR SURGERY. Opportunity given to ask and answer questions as well as to observe return demonstration. Patient signature_____________________________    Witness____________________________ Unable to assess None known

## 2022-03-18 PROBLEM — M43.10 SPONDYLISTHESIS: Status: ACTIVE | Noted: 2018-07-23

## 2022-03-19 PROBLEM — S46.012A TRAUMATIC COMPLETE TEAR OF LEFT ROTATOR CUFF: Status: ACTIVE | Noted: 2019-08-01

## 2022-03-20 PROBLEM — M48.062 SPINAL STENOSIS OF LUMBAR REGION WITH NEUROGENIC CLAUDICATION: Status: ACTIVE | Noted: 2018-07-23

## 2023-01-06 ENCOUNTER — TRANSCRIBE ORDER (OUTPATIENT)
Dept: SCHEDULING | Age: 73
End: 2023-01-06

## 2023-01-06 DIAGNOSIS — R10.11 ABDOMINAL PAIN, RIGHT UPPER QUADRANT: Primary | ICD-10-CM

## 2023-01-13 ENCOUNTER — HOSPITAL ENCOUNTER (OUTPATIENT)
Dept: ULTRASOUND IMAGING | Age: 73
Discharge: HOME | End: 2023-01-13
Payer: MEDICARE

## 2023-01-13 DIAGNOSIS — R10.11 ABDOMINAL PAIN, RIGHT UPPER QUADRANT: ICD-10-CM

## 2023-01-13 PROCEDURE — 76700 US EXAM ABDOM COMPLETE: CPT

## 2023-06-15 ENCOUNTER — HOSPITAL ENCOUNTER (OUTPATIENT)
Facility: HOSPITAL | Age: 73
Discharge: HOME | End: 2023-06-18
Payer: MEDICARE

## 2023-06-15 DIAGNOSIS — I47.20 V TACH (HCC): ICD-10-CM

## 2023-06-15 DIAGNOSIS — R94.39 ABNORMAL STRESS ECG: ICD-10-CM

## 2023-06-15 PROCEDURE — 71046 X-RAY EXAM CHEST 2 VIEWS: CPT

## 2023-06-15 RX ORDER — METOPROLOL SUCCINATE 50 MG/1
50 TABLET, EXTENDED RELEASE ORAL DAILY
COMMUNITY

## 2023-06-15 RX ORDER — ASPIRIN 81 MG/1
81 TABLET ORAL DAILY
COMMUNITY

## 2023-06-15 RX ORDER — TADALAFIL 5 MG/1
5 TABLET ORAL PRN
COMMUNITY

## 2023-06-15 RX ORDER — ROSUVASTATIN CALCIUM 10 MG/1
10 TABLET, COATED ORAL DAILY
COMMUNITY

## 2023-06-16 ENCOUNTER — HOSPITAL ENCOUNTER (OUTPATIENT)
Facility: HOSPITAL | Age: 73
Setting detail: OUTPATIENT SURGERY
Discharge: HOME | End: 2023-06-16
Attending: INTERNAL MEDICINE | Admitting: INTERNAL MEDICINE
Payer: MEDICARE

## 2023-06-16 VITALS
BODY MASS INDEX: 27.5 KG/M2 | OXYGEN SATURATION: 99 % | SYSTOLIC BLOOD PRESSURE: 176 MMHG | TEMPERATURE: 97.9 F | WEIGHT: 185.7 LBS | HEIGHT: 69 IN | RESPIRATION RATE: 11 BRPM | HEART RATE: 41 BPM | DIASTOLIC BLOOD PRESSURE: 57 MMHG

## 2023-06-16 PROCEDURE — 2580000003 HC RX 258: Performed by: INTERNAL MEDICINE

## 2023-06-16 PROCEDURE — 7100000010 HC PHASE II RECOVERY - FIRST 15 MIN: Performed by: INTERNAL MEDICINE

## 2023-06-16 PROCEDURE — 3600007512: Performed by: INTERNAL MEDICINE

## 2023-06-16 PROCEDURE — 3600007502: Performed by: INTERNAL MEDICINE

## 2023-06-16 PROCEDURE — 88305 TISSUE EXAM BY PATHOLOGIST: CPT

## 2023-06-16 PROCEDURE — 7100000011 HC PHASE II RECOVERY - ADDTL 15 MIN: Performed by: INTERNAL MEDICINE

## 2023-06-16 PROCEDURE — 3700000001 HC ADD 15 MINUTES (ANESTHESIA): Performed by: INTERNAL MEDICINE

## 2023-06-16 PROCEDURE — 2709999900 HC NON-CHARGEABLE SUPPLY: Performed by: INTERNAL MEDICINE

## 2023-06-16 PROCEDURE — 3700000000 HC ANESTHESIA ATTENDED CARE: Performed by: INTERNAL MEDICINE

## 2023-06-16 PROCEDURE — 88342 IMHCHEM/IMCYTCHM 1ST ANTB: CPT

## 2023-06-16 RX ORDER — SODIUM CHLORIDE 0.9 % (FLUSH) 0.9 %
5-40 SYRINGE (ML) INJECTION EVERY 12 HOURS SCHEDULED
Status: DISCONTINUED | OUTPATIENT
Start: 2023-06-16 | End: 2023-06-16 | Stop reason: HOSPADM

## 2023-06-16 RX ORDER — SODIUM CHLORIDE 9 MG/ML
25 INJECTION, SOLUTION INTRAVENOUS PRN
Status: DISCONTINUED | OUTPATIENT
Start: 2023-06-16 | End: 2023-06-16 | Stop reason: HOSPADM

## 2023-06-16 RX ORDER — SODIUM CHLORIDE 0.9 % (FLUSH) 0.9 %
5-40 SYRINGE (ML) INJECTION PRN
Status: DISCONTINUED | OUTPATIENT
Start: 2023-06-16 | End: 2023-06-16 | Stop reason: HOSPADM

## 2023-06-16 RX ADMIN — SODIUM CHLORIDE 25 ML: 9 INJECTION, SOLUTION INTRAVENOUS at 12:19

## 2023-06-16 ASSESSMENT — PAIN - FUNCTIONAL ASSESSMENT: PAIN_FUNCTIONAL_ASSESSMENT: NONE - DENIES PAIN

## 2023-06-16 NOTE — OP NOTE
NAME:  Jerald Sanchez   :   1950   MRN:   [id number]     Date/Time:  2023 12:51 PM    Colonoscopy Operative Report    Procedure Type:   Colonoscopy --screening     Indications:     Screening colonoscopy  Pre-operative Diagnosis: see indication above  Post-operative Diagnosis:  See findings below  :  Nelly Guerrero MD  Referring Provider: --Yoana Mccormick MD    Exam:  Airway: clear, no airway problems anticipated  Heart: RRR, without gallops or rubs  Lungs: clear bilaterally without wheezes, crackles, or rhonchi  Abdomen: soft, nontender, nondistended, bowel sounds present  Mental Status: awake, alert and oriented to person, place and time    Sedation:  MAC anesthesia Propofol  Procedure Details:  After informed consent was obtained with all risks and benefits of procedure explained and preoperative exam completed, the patient was taken to the endoscopy suite and placed in the left lateral decubitus position. Upon sequential sedation as per above, a digital rectal exam was performed demonstrating internal hemorrhoids. The Olympus videocolonoscope  was inserted in the rectum and carefully advanced to the cecum, which was identified by the ileocecal valve and appendiceal orifice. The quality of preparation was good. The colonoscope was slowly withdrawn with careful evaluation between folds. Retroflexion in the rectum was completed demonstrating internal hemorrhoids. Findings:   Normal colonoscopy through to the cecum  Medium sized internal hemorrhoids seen on retroflexion    Specimen Removed:  None  Complications: None. EBL:  None. Impression:    Normal colonoscopy through to the cecum  Medium sized internal hemorrhoids seen on retroflexion    Recommendations:   No further colonoscopies indicated for pure screening purposes    Discharge Disposition:  Home in the company of a  when able to ambulate.       Nelly Guerrero MD

## 2023-06-16 NOTE — ANESTHESIA PRE-OP
Spoke with Dr. Marie Camarillo. He is cleared by Cardiology and Dr. Marie Camarillo is requesting EGD and Colonoscopy be done PRIOR to Left heart Cath that is scheduled next week. Risks benefits discussed with Dr. Marie Camarillo and Mr. Ranjith Wyatt and both are in agreement to proceed

## 2023-06-16 NOTE — DISCHARGE INSTRUCTIONS
Marti Steve  [id number]  1950    COLON DISCHARGE INSTRUCTIONS  Discomfort:  Redness at IV site- apply warm compress to area; if redness or soreness persist- contact your physician  There may be a slight amount of blood passed from the rectum  Gaseous discomfort- walking, belching will help relieve any discomfort  You may not operate a vehicle for 12 hours  You may not engage in an occupation involving machinery or appliances for rest of today  You may not drink alcoholic beverages for at least 12 hours  Avoid making any critical decisions for at least 24 hour  DIET:   Regular diet. - however -  remember your colon is empty and a heavy meal will produce gas. Avoid these foods:  vegetables, fried / greasy foods, carbonated drinks for today  MEDICATION:  [unfilled]     ACTIVITY:  You may not resume your normal daily activities until tomorrow AM; it is recommended that you spend the remainder of the day resting -  avoid any strenuous activity. CALL M.D. ANY SIGN OF:   Increasing pain, nausea, vomiting  Abdominal distension (swelling)  New increased bleeding (oral or rectal)  Fever (chills)  Pain in chest area  Bloody discharge from nose or mouth  Shortness of breath    You may not  take any Advil, Aspirin, Ibuprofen, Motrin, Aleve, or Goodys for 10 days, ONLY  Tylenol as needed for pain. IMPRESSION:  EGD:  Impression:    Normal proximal, mid, and distal esophagus  Mild, patchy, non-erosive antral gastropathy. Biopsied  Stomach otherwise normal, including retroflexion  Normal duodenal bulb and 2nd/3rd portion of the duodenum. Biopsied     Recommendations:   Follow up pathology    Colon:  Impression:    Normal colonoscopy through to the cecum  Medium sized internal hemorrhoids seen on retroflexion    Recommendations:   No further colonoscopies indicated for pure screening purposes    Follow-up Instructions:   Call Dr. Kai Myers for the results of procedure / biopsy in 7-10 days  Telephone #

## 2023-06-16 NOTE — PERIOP NOTE
Endoscopy Case End Note:    1249:  Procedure scope was pre-cleaned, per protocol, at bedside by WANDY Casey. 1253:  Report received from anesthesia - GUNNAR Dale CRNA. See anesthesia flowsheet for intra-procedure vital signs and events. 1253:  Glasses returned to patient.

## 2023-06-16 NOTE — OP NOTE
NAME:  Gwyn Ibarra   :   1950   MRN:   [id number]     Date/Time:  2023 12:34 PM    Esophagogastroduodenoscopy (EGD) Procedure Note    Procedure: Esophagogastroduodenoscopy with biopsy    Indication: Abdominal pain, epigastric  Pre-operative Diagnosis: see indication above  Post-operative Diagnosis: see findings below  :  Maggie Alegria MD  Referring Provider:   --Mark Paz MD    Exam:  Airway: clear, no airway problems anticipated  Heart: RRR, without gallops or rubs  Lungs: clear bilaterally without wheezes, crackles, or rhonchi  Abdomen: soft, nontender, nondistended, bowel sounds present  Mental Status: awake, alert and oriented to person, place and time     Anethesia/Sedation:  MAC anesthesia Propofol  Procedure Details   After informed consent was obtained for the procedure, with all risks and benefits of procedure explained the patient was taken to the endoscopy suite and placed in the left lateral decubitus position. Following sequential administration of sedation as per above, the [id number] gastroscope was inserted into the mouth and advanced under direct vision to third portion of the duodenum. A careful inspection was made as the gastroscope was withdrawn, including a retroflexed view of the proximal stomach; findings and interventions are described below. Findings:   Normal proximal, mid, and distal esophagus  Mild, patchy, non-erosive antral gastropathy. Biopsied  Stomach otherwise normal, including retroflexion  Normal duodenal bulb and 2nd/3rd portion of the duodenum. Biopsied    Therapies:  1. Biopsies    Specimens: 1. Duodenum 2. Gastric    EBL:  None. Complications:   None; patient tolerated the procedure well. Impression:    Normal proximal, mid, and distal esophagus  Mild, patchy, non-erosive antral gastropathy. Biopsied  Stomach otherwise normal, including retroflexion  Normal duodenal bulb and 2nd/3rd portion of the duodenum.

## 2023-06-16 NOTE — H&P
Gastroenterology Outpatient History and Physical    Patient: Petros Shukla    Physician: Kiah Avelar MD    Chief Complaint: Epigastric AP  History of Present Illness: CRC screening    History:  Past Medical History:   Diagnosis Date    Arthritis     right knee    Autoimmune disease ([facility] [address]     Celiac disease    Celiac disease     Diabetes mellitus ([facility] [address]     Gastrointestinal disorder     acid reflux    GERD (gastroesophageal reflux disease)     GERD (gastroesophageal reflux disease)     Hiatal hernia     Hypertension     Ill-defined condition     ruptured petalla rt    Ill-defined condition     hiatal hernia    Other unknown and unspecified cause of morbidity or mortality     nasal polyps      Past Surgical History:   Procedure Laterality Date    ADENOIDECTOMY      COLONOSCOPY N/A 2/26/2020    COLONOSCOPY performed by Jospeh Pascual MD at [facility] ENDOSCOPY    HEENT      removal of polyps in septum and repaired deviated septum    LUMBAR FUSION  07/2018    L 4-L5     ORTHOPEDIC SURGERY      right side patella surgery    OTHER SURGICAL HISTORY      dislocation of left foot non surgical realignment     OTHER SURGICAL HISTORY      EGD/colonoscopy    REVISION OF UNSTABLE PATELLA      right knee ruptured patella    ROTATOR CUFF REPAIR Left 2019    left    TONSILLECTOMY      TOTAL KNEE ARTHROPLASTY Right       Social History     Socioeconomic History    Marital status:      Spouse name: None    Number of children: None    Years of education: None    Highest education level: None   Tobacco Use    Smoking status: Never    Smokeless tobacco: Never   Vaping Use    Vaping Use: Never used   Substance and Sexual Activity    Alcohol use: No    Drug use: Yes     Types: Prescription, OTC      Family History   Problem Relation Age of Onset    Hypertension Paternal Uncle     Heart Disease Mother     Heart Disease Brother       Patient Active Problem List   Diagnosis    GERD (gastroesophageal reflux disease)    HTN

## 2023-06-21 ENCOUNTER — HOSPITAL ENCOUNTER (OUTPATIENT)
Facility: HOSPITAL | Age: 73
Discharge: HOME | End: 2023-06-21
Attending: INTERNAL MEDICINE | Admitting: INTERNAL MEDICINE
Payer: MEDICARE

## 2023-06-21 VITALS
HEIGHT: 69 IN | OXYGEN SATURATION: 95 % | TEMPERATURE: 97.7 F | SYSTOLIC BLOOD PRESSURE: 122 MMHG | HEART RATE: 46 BPM | WEIGHT: 185 LBS | DIASTOLIC BLOOD PRESSURE: 68 MMHG | BODY MASS INDEX: 27.4 KG/M2 | RESPIRATION RATE: 18 BRPM

## 2023-06-21 DIAGNOSIS — I47.1 SVT (SUPRAVENTRICULAR TACHYCARDIA) (HCC): ICD-10-CM

## 2023-06-21 PROBLEM — I47.29 NSVT (NONSUSTAINED VENTRICULAR TACHYCARDIA) (HCC): Status: ACTIVE | Noted: 2023-06-21

## 2023-06-21 LAB — ECHO BSA: 2.02 M2

## 2023-06-21 PROCEDURE — C1769 GUIDE WIRE: HCPCS | Performed by: INTERNAL MEDICINE

## 2023-06-21 PROCEDURE — C1894 INTRO/SHEATH, NON-LASER: HCPCS | Performed by: INTERNAL MEDICINE

## 2023-06-21 PROCEDURE — 6360000004 HC RX CONTRAST MEDICATION: Performed by: INTERNAL MEDICINE

## 2023-06-21 PROCEDURE — 99153 MOD SED SAME PHYS/QHP EA: CPT | Performed by: INTERNAL MEDICINE

## 2023-06-21 PROCEDURE — 6360000002 HC RX W HCPCS: Performed by: INTERNAL MEDICINE

## 2023-06-21 PROCEDURE — 93454 CORONARY ARTERY ANGIO S&I: CPT | Performed by: INTERNAL MEDICINE

## 2023-06-21 PROCEDURE — 2500000003 HC RX 250 WO HCPCS: Performed by: INTERNAL MEDICINE

## 2023-06-21 PROCEDURE — 99152 MOD SED SAME PHYS/QHP 5/>YRS: CPT | Performed by: INTERNAL MEDICINE

## 2023-06-21 PROCEDURE — 2709999900 HC NON-CHARGEABLE SUPPLY: Performed by: INTERNAL MEDICINE

## 2023-06-21 PROCEDURE — 76937 US GUIDE VASCULAR ACCESS: CPT | Performed by: INTERNAL MEDICINE

## 2023-06-21 RX ORDER — KETOCONAZOLE 20 MG/G
2 CREAM TOPICAL 2 TIMES DAILY
COMMUNITY

## 2023-06-21 RX ORDER — FENTANYL CITRATE 50 UG/ML
INJECTION, SOLUTION INTRAMUSCULAR; INTRAVENOUS PRN
Status: DISCONTINUED | OUTPATIENT
Start: 2023-06-21 | End: 2023-06-21 | Stop reason: HOSPADM

## 2023-06-21 RX ORDER — SODIUM CHLORIDE 9 MG/ML
INJECTION, SOLUTION INTRAVENOUS PRN
Status: DISCONTINUED | OUTPATIENT
Start: 2023-06-21 | End: 2023-06-21 | Stop reason: HOSPADM

## 2023-06-21 RX ORDER — HEPARIN SODIUM 10000 [USP'U]/ML
INJECTION, SOLUTION INTRAVENOUS; SUBCUTANEOUS PRN
Status: DISCONTINUED | OUTPATIENT
Start: 2023-06-21 | End: 2023-06-21 | Stop reason: HOSPADM

## 2023-06-21 RX ORDER — FAMOTIDINE 20 MG/1
20 TABLET, FILM COATED ORAL 2 TIMES DAILY
COMMUNITY

## 2023-06-21 RX ORDER — SODIUM CHLORIDE 0.9 % (FLUSH) 0.9 %
5-40 SYRINGE (ML) INJECTION PRN
Status: DISCONTINUED | OUTPATIENT
Start: 2023-06-21 | End: 2023-06-21 | Stop reason: HOSPADM

## 2023-06-21 RX ORDER — VERAPAMIL HYDROCHLORIDE 2.5 MG/ML
INJECTION, SOLUTION INTRAVENOUS PRN
Status: DISCONTINUED | OUTPATIENT
Start: 2023-06-21 | End: 2023-06-21 | Stop reason: HOSPADM

## 2023-06-21 RX ORDER — ACETAMINOPHEN 325 MG/1
650 TABLET ORAL EVERY 4 HOURS PRN
Status: DISCONTINUED | OUTPATIENT
Start: 2023-06-21 | End: 2023-06-21 | Stop reason: HOSPADM

## 2023-06-21 RX ORDER — SODIUM CHLORIDE 9 MG/ML
INJECTION, SOLUTION INTRAVENOUS CONTINUOUS
Status: DISCONTINUED | OUTPATIENT
Start: 2023-06-21 | End: 2023-06-21 | Stop reason: HOSPADM

## 2023-06-21 RX ORDER — HEPARIN SODIUM 1000 [USP'U]/ML
INJECTION, SOLUTION INTRAVENOUS; SUBCUTANEOUS PRN
Status: DISCONTINUED | OUTPATIENT
Start: 2023-06-21 | End: 2023-06-21 | Stop reason: HOSPADM

## 2023-06-21 RX ORDER — LIDOCAINE HYDROCHLORIDE 10 MG/ML
INJECTION, SOLUTION INFILTRATION; PERINEURAL PRN
Status: DISCONTINUED | OUTPATIENT
Start: 2023-06-21 | End: 2023-06-21 | Stop reason: HOSPADM

## 2023-06-21 RX ORDER — SODIUM CHLORIDE 0.9 % (FLUSH) 0.9 %
5-40 SYRINGE (ML) INJECTION EVERY 12 HOURS SCHEDULED
Status: DISCONTINUED | OUTPATIENT
Start: 2023-06-21 | End: 2023-06-21 | Stop reason: HOSPADM

## 2023-06-21 RX ORDER — M-VIT,TX,IRON,MINS/CALC/FOLIC 27MG-0.4MG
1 TABLET ORAL DAILY
COMMUNITY

## 2023-06-21 NOTE — PROGRESS NOTES
I have reviewed Discharge Instructions with the patient. The patient verbalized understanding. Discharge medications reviewed with patient along with appropriate educational materials. Opportunity for questions and clarification was provided. All lines removed without difficulty. Cath site is clean, dry, and intact. Patient's belongings gathered and with patient. Patient is ready for discharge.

## 2023-06-21 NOTE — DISCHARGE INSTRUCTIONS
[address]    [phone]  [place], [place] [address]    [email or web address]    Patient Discharge Instructions    Conan Eisenmenger / [id number] : 1950    Admitted 2023 Discharged 2023     It is important that you take the medication exactly as they are prescribed. Keep your medication in the bottles provided by the pharmacist and keep a list of the medication names, dosages, and times to be taken in your wallet. Do not take other medications without consulting your doctor. BRING ALL OF YOUR MEDICINES or a list of medicines with dosages TO YOUR OFFICE VISIT with Dr. Johanne Peguero. Cardiac Catheterization  Discharge Instructions  Transradial Catheterization Discharge Instructions (WRIST)     Discharge instructions: Your radial artery in your wrist was used for your cardiac catheterization. This site may be slightly bruised and sore following your procedure. Expect mild tingling or the hand and tenderness at the puncture site for up to 3 days. Excess movement of the wrist used should be avoided for the next 24-48 hours. No lifting over 2 pounds (approximately a ½ gallon of milk) with this arm for 24 hours. Keep the site of the procedure covered with a bandage for 24 hours. You may shower the day after your procedure. Do not take a tub bath or submerge the puncture site in water for 48 hours. No heavy impact activity/lifting > 10 pounds for 1 week. If bleeding of the wrist occurs at home:   If the site on your wrist where you had the catheterization procedure begins to bleed, do not panic. Place 1 or 2 fingers over the puncture site and hold pressure to stop the bleeding. You may be able to feel your pulse as you hold pressure. Lift your fingers after 5 minutes to see if the bleeding has stopped. Once the bleeding has stopped, gently wipe the wrist area clean and cover with a bandage.       If the bleeding from your wrist does not stop after 15 minutes, or if

## 2023-06-22 NOTE — PROGRESS NOTES
Chart reviewed: Patient is 67 y.o. male admitted with SVT (supraventricular tachycardia) ([facility] [address] [I47.1]  NSVT (nonsustained ventricular tachycardia) ([facility] [address] [I47.29]    S/p cardiac cath 06/21 w/ \"mild CAD\" per Dr. Ailyn Daniels cath report    No EF available in EMR    Cardiac Rehab is not indicated at this time    Karen Abbasi RN

## 2024-05-14 ENCOUNTER — HOSPITAL ENCOUNTER (OUTPATIENT)
Facility: HOSPITAL | Age: 74
Discharge: HOME | End: 2024-05-17
Attending: INTERNAL MEDICINE
Payer: MEDICARE

## 2024-05-14 VITALS
RESPIRATION RATE: 22 BRPM | DIASTOLIC BLOOD PRESSURE: 99 MMHG | SYSTOLIC BLOOD PRESSURE: 138 MMHG | OXYGEN SATURATION: 96 % | HEART RATE: 70 BPM

## 2024-05-14 DIAGNOSIS — I47.20 VENTRICULAR TACHYARRHYTHMIA (HCC): ICD-10-CM

## 2024-05-14 PROCEDURE — 6360000004 HC RX CONTRAST MEDICATION: Performed by: INTERNAL MEDICINE

## 2024-05-14 PROCEDURE — A9579 GAD-BASE MR CONTRAST NOS,1ML: HCPCS | Performed by: INTERNAL MEDICINE

## 2024-05-14 PROCEDURE — 75561 CARDIAC MRI FOR MORPH W/DYE: CPT | Performed by: INTERNAL MEDICINE

## 2024-05-14 PROCEDURE — 75561 CARDIAC MRI FOR MORPH W/DYE: CPT

## 2024-05-14 RX ADMIN — GADOTERIDOL 31 ML: 279.3 INJECTION, SOLUTION INTRAVENOUS at 09:30

## 2024-05-14 NOTE — PROGRESS NOTES
Called into MRI r/t pt. Having Pacer/Defib ([employer]).  [employer] rep. Here to turn pacer/defib off for procedure and EKG obtained prior to her turning off.  Pt noted without c/o chest pain, shortness of breath.  Pt laying supine with cardiac monitor, pulse ox and dinemapp (only on for pre/post study).    0905 - HR = 70  pulse ox = 95% R/A  pt still noted without difficulty breathing, chest pain.    0925 - BP = 138/99  HR= 70 Regular  Pulse ox = 96% R/A.  Pt still noted as above with c/o pain, shortness of breath, chest pain.  [employer] pacer/defib rep. Here to turn pacer/defib back on and obtain EKG strip showing pacer/defib back on.

## 2025-02-19 ENCOUNTER — HOSPITAL ENCOUNTER (EMERGENCY)
Facility: HOSPITAL | Age: 75
Discharge: HOME | End: 2025-02-19
Attending: EMERGENCY MEDICINE
Payer: MEDICARE

## 2025-02-19 ENCOUNTER — APPOINTMENT (OUTPATIENT)
Facility: HOSPITAL | Age: 75
End: 2025-02-19
Payer: MEDICARE

## 2025-02-19 VITALS
HEART RATE: 63 BPM | OXYGEN SATURATION: 98 % | BODY MASS INDEX: 31.64 KG/M2 | RESPIRATION RATE: 16 BRPM | SYSTOLIC BLOOD PRESSURE: 171 MMHG | HEIGHT: 69 IN | DIASTOLIC BLOOD PRESSURE: 110 MMHG | WEIGHT: 213.63 LBS | TEMPERATURE: 98.1 F

## 2025-02-19 DIAGNOSIS — R10.11 ABDOMINAL PAIN, RIGHT UPPER QUADRANT: ICD-10-CM

## 2025-02-19 DIAGNOSIS — R10.31 ABDOMINAL PAIN, RIGHT LOWER QUADRANT: Primary | ICD-10-CM

## 2025-02-19 LAB
ALBUMIN SERPL-MCNC: 4.2 G/DL (ref 3.5–5)
ALBUMIN/GLOB SERPL: 1.2 (ref 1.1–2.2)
ALP SERPL-CCNC: 79 U/L (ref 45–117)
ALT SERPL-CCNC: 27 U/L (ref 12–78)
ANION GAP SERPL CALC-SCNC: 4 MMOL/L (ref 2–12)
APPEARANCE UR: CLEAR
AST SERPL-CCNC: 18 U/L (ref 15–37)
BACTERIA URNS QL MICRO: NEGATIVE /HPF
BASOPHILS # BLD: 0.08 K/UL (ref 0–0.1)
BASOPHILS NFR BLD: 1 % (ref 0–1)
BILIRUB SERPL-MCNC: 1 MG/DL (ref 0.2–1)
BILIRUB UR QL: NEGATIVE
BUN SERPL-MCNC: 17 MG/DL (ref 6–20)
BUN/CREAT SERPL: 12 (ref 12–20)
CALCIUM SERPL-MCNC: 9.8 MG/DL (ref 8.5–10.1)
CHLORIDE SERPL-SCNC: 106 MMOL/L (ref 97–108)
CO2 SERPL-SCNC: 30 MMOL/L (ref 21–32)
COLOR UR: ABNORMAL
COMMENT:: NORMAL
CREAT SERPL-MCNC: 1.41 MG/DL (ref 0.7–1.3)
DIFFERENTIAL METHOD BLD: NORMAL
EOSINOPHIL # BLD: 0.35 K/UL (ref 0–0.4)
EOSINOPHIL NFR BLD: 4.3 % (ref 0–7)
EPITH CASTS URNS QL MICRO: ABNORMAL /LPF
ERYTHROCYTE [DISTWIDTH] IN BLOOD BY AUTOMATED COUNT: 14 % (ref 11.5–14.5)
GLOBULIN SER CALC-MCNC: 3.4 G/DL (ref 2–4)
GLUCOSE SERPL-MCNC: 113 MG/DL (ref 65–100)
GLUCOSE UR STRIP.AUTO-MCNC: NEGATIVE MG/DL
HCT VFR BLD AUTO: 46.7 % (ref 36.6–50.3)
HGB BLD-MCNC: 15.6 G/DL (ref 12.1–17)
HGB UR QL STRIP: NEGATIVE
HYALINE CASTS URNS QL MICRO: ABNORMAL /LPF (ref 0–2)
IMM GRANULOCYTES # BLD AUTO: 0.03 K/UL (ref 0–0.04)
IMM GRANULOCYTES NFR BLD AUTO: 0.4 % (ref 0–0.5)
KETONES UR QL STRIP.AUTO: NEGATIVE MG/DL
LEUKOCYTE ESTERASE UR QL STRIP.AUTO: ABNORMAL
LIPASE SERPL-CCNC: 37 U/L (ref 13–75)
LYMPHOCYTES # BLD: 1.69 K/UL (ref 0.8–3.5)
LYMPHOCYTES NFR BLD: 20.8 % (ref 12–49)
MCH RBC QN AUTO: 29.9 PG (ref 26–34)
MCHC RBC AUTO-ENTMCNC: 33.4 G/DL (ref 30–36.5)
MCV RBC AUTO: 89.6 FL (ref 80–99)
MONOCYTES # BLD: 0.86 K/UL (ref 0–1)
MONOCYTES NFR BLD: 10.6 % (ref 5–13)
NEUTS SEG # BLD: 5.13 K/UL (ref 1.8–8)
NEUTS SEG NFR BLD: 62.9 % (ref 32–75)
NITRITE UR QL STRIP.AUTO: NEGATIVE
NRBC # BLD: 0 K/UL (ref 0–0.01)
NRBC BLD-RTO: 0 PER 100 WBC
PH UR STRIP: 5.5 (ref 5–8)
PLATELET # BLD AUTO: 180 K/UL (ref 150–400)
PMV BLD AUTO: 10.7 FL (ref 8.9–12.9)
POTASSIUM SERPL-SCNC: 4.7 MMOL/L (ref 3.5–5.1)
PROT SERPL-MCNC: 7.6 G/DL (ref 6.4–8.2)
PROT UR STRIP-MCNC: NEGATIVE MG/DL
RBC # BLD AUTO: 5.21 M/UL (ref 4.1–5.7)
RBC #/AREA URNS HPF: ABNORMAL /HPF (ref 0–5)
SODIUM SERPL-SCNC: 140 MMOL/L (ref 136–145)
SP GR UR REFRACTOMETRY: 1.02
SPECIMEN HOLD: NORMAL
TROPONIN I SERPL HS-MCNC: 9 NG/L (ref 0–76)
URINE CULTURE IF INDICATED: ABNORMAL
UROBILINOGEN UR QL STRIP.AUTO: 0.2 EU/DL (ref 0.2–1)
WBC # BLD AUTO: 8.1 K/UL (ref 4.1–11.1)
WBC URNS QL MICRO: ABNORMAL /HPF (ref 0–4)

## 2025-02-19 PROCEDURE — 81001 URINALYSIS AUTO W/SCOPE: CPT

## 2025-02-19 PROCEDURE — 74177 CT ABD & PELVIS W/CONTRAST: CPT

## 2025-02-19 PROCEDURE — 99285 EMERGENCY DEPT VISIT HI MDM: CPT

## 2025-02-19 PROCEDURE — 6360000004 HC RX CONTRAST MEDICATION: Performed by: EMERGENCY MEDICINE

## 2025-02-19 PROCEDURE — 83690 ASSAY OF LIPASE: CPT

## 2025-02-19 PROCEDURE — 80053 COMPREHEN METABOLIC PANEL: CPT

## 2025-02-19 PROCEDURE — 76705 ECHO EXAM OF ABDOMEN: CPT

## 2025-02-19 PROCEDURE — 84484 ASSAY OF TROPONIN QUANT: CPT

## 2025-02-19 PROCEDURE — 85025 COMPLETE CBC W/AUTO DIFF WBC: CPT

## 2025-02-19 PROCEDURE — 36415 COLL VENOUS BLD VENIPUNCTURE: CPT

## 2025-02-19 RX ORDER — IOPAMIDOL 755 MG/ML
100 INJECTION, SOLUTION INTRAVASCULAR
Status: COMPLETED | OUTPATIENT
Start: 2025-02-19 | End: 2025-02-19

## 2025-02-19 RX ORDER — ONDANSETRON 4 MG/1
4 TABLET, ORALLY DISINTEGRATING ORAL EVERY 8 HOURS PRN
Qty: 20 TABLET | Refills: 0 | Status: SHIPPED | OUTPATIENT
Start: 2025-02-19

## 2025-02-19 RX ORDER — ONDANSETRON 2 MG/ML
4 INJECTION INTRAMUSCULAR; INTRAVENOUS
Status: DISCONTINUED | OUTPATIENT
Start: 2025-02-19 | End: 2025-02-19 | Stop reason: HOSPADM

## 2025-02-19 RX ORDER — DICYCLOMINE HCL 20 MG
20 TABLET ORAL 4 TIMES DAILY PRN
Qty: 28 TABLET | Refills: 0 | Status: SHIPPED | OUTPATIENT
Start: 2025-02-19

## 2025-02-19 RX ADMIN — IOPAMIDOL 100 ML: 755 INJECTION, SOLUTION INTRAVENOUS at 12:54

## 2025-02-19 ASSESSMENT — PAIN DESCRIPTION - ORIENTATION: ORIENTATION: RIGHT;LOWER

## 2025-02-19 ASSESSMENT — PAIN SCALES - GENERAL: PAINLEVEL_OUTOF10: 1

## 2025-02-19 ASSESSMENT — PAIN DESCRIPTION - LOCATION: LOCATION: ABDOMEN

## 2025-02-19 ASSESSMENT — PAIN DESCRIPTION - DESCRIPTORS: DESCRIPTORS: DISCOMFORT

## 2025-02-19 NOTE — ED PROVIDER NOTES
Tenderness: There is abdominal tenderness (Right lower quadrant). There is no guarding or rebound.   Neurological:      General: No focal deficit present.      Mental Status: He is alert and oriented to person, place, and time.          DIAGNOSTIC RESULTS   LABS:     Labs Reviewed   COMPREHENSIVE METABOLIC PANEL - Abnormal; Notable for the following components:       Result Value    Glucose 113 (*)     Creatinine 1.41 (*)     Est, Glom Filt Rate 52 (*)     All other components within normal limits   URINALYSIS WITH REFLEX TO CULTURE - Abnormal; Notable for the following components:    Leukocyte Esterase, Urine TRACE (*)     All other components within normal limits   CBC WITH AUTO DIFFERENTIAL   LIPASE   EXTRA TUBES HOLD   TROPONIN          EKG: If performed, independent interpretation documented below in the MDM section     RADIOLOGY:  Non-plain film images such as CT, Ultrasound and MRI are read by the radiologist. Plain radiographic images are visualized and preliminarily interpreted by the ED Provider with the findings documented in the MDM section.     Interpretation per the Radiologist below, if available at the time of this note:     [unfilled]    ABDOMEN LIMITED  CT ABDOMEN PELVIS W IV CONTRAST       PROCEDURES   Unless otherwise noted below, none  Procedures     CRITICAL CARE TIME   0    EMERGENCY DEPARTMENT COURSE and DIFFERENTIAL DIAGNOSIS/MDM   Vitals:    Vitals:    02/19/25 0930   BP: (!) 171/110   Pulse: 63   Resp: 16   Temp: 98.1 °F (36.7 °C)   TempSrc: Oral   SpO2: 98%   Weight: 96.9 kg (213 lb 10 oz)   Height: 1.753 m (5' 9\")        Patient was given the following medications:  Medications   ondansetron (ZOFRAN) injection 4 mg (has no administration in time range)   iopamidol (ISOVUE-370) 76 % injection 100 mL (100 mLs IntraVENous Given 2/19/25 1254)       Medical Decision Making  Amount and/or Complexity of Data Reviewed  Independent Historian: spouse     Details: Wife  Labs: ordered.

## 2025-02-19 NOTE — DISCHARGE INSTRUCTIONS
You were evaluated in the emergency department for abdominal pain.  Your examination was reassuring as was your work-up including lab work, urinalysis, CT scan, and ultrasound.  It will be important for you to follow-up with your primary care physician in 5-7 days as well as your GI doctor.  If you develop worsening symptoms such as worsening abdominal pain, vomiting, or fevers, please return to the emergency department immediately.

## (undated) DEVICE — 3M™ TEGADERM™ TRANSPARENT FILM DRESSING FRAME STYLE, 1624W, 2-3/8 IN X 2-3/4 IN (6 CM X 7 CM), 100/CT 4CT/CASE: Brand: 3M™ TEGADERM™

## (undated) DEVICE — 4.5 MM FULL RADIUS STRAIGHT                                    BLADES, POWER/EP-1, YELLOW, PACKAGED                                    6 PER BOX, STERILE: Brand: DYONICS

## (undated) DEVICE — STERILE POLYISOPRENE POWDER-FREE SURGICAL GLOVES: Brand: PROTEXIS

## (undated) DEVICE — SYR 50ML LR LCK 1ML GRAD NSAF --

## (undated) DEVICE — Z DISCONTINUED PER MEDLINE LINE GAS SAMPLING O2/CO2 LNG AD 13 FT NSL W/ TBNG FILTERLINE

## (undated) DEVICE — CATHETER IV 20GA L1.16IN OD1.0414-1.1176MM ID0.762-0.8382MM

## (undated) DEVICE — TOWEL 4 PLY TISS 19X30 SUE WHT

## (undated) DEVICE — GAUZE SPONGES,12 PLY: Brand: CURITY

## (undated) DEVICE — CONTAINER SPEC 20 ML LID NEUT BUFF FORMALIN 10 % POLYPR STS

## (undated) DEVICE — NDL SPNE QNCKE 18GX3.5IN LF --

## (undated) DEVICE — APPLIER CLP AUTO MED 9.75 IN TI SURGCLP SUPER INTLOK 20 DISP

## (undated) DEVICE — NEEDLE HYPO 18GA L1.5IN PNK S STL HUB POLYPR SHLD REG BVL

## (undated) DEVICE — SYR 3ML LL TIP 1/10ML GRAD --

## (undated) DEVICE — NEONATAL-ADULT SPO2 SENSOR: Brand: NELLCOR

## (undated) DEVICE — CATH IV AUTOGRD BC PNK 20GA 25 -- INSYTE

## (undated) DEVICE — DYONICS 25 INFLOW TUBE SET, 3 PER BOX

## (undated) DEVICE — SUTURE MCRYL SZ 2-0 L36IN ABSRB UD L36MM CT-1 1/2 CIR Y945H

## (undated) DEVICE — Device

## (undated) DEVICE — SHOULDER W/POUCH II-LF: Brand: MEDLINE INDUSTRIES, INC.

## (undated) DEVICE — SURGIFOAM SPNG SZ 100

## (undated) DEVICE — FLOSEAL MATRIX IS INDICATED IN SURGICAL PROCEDURES (OTHER THAN IN OPHTHALMIC) AS AN ADJUNCT TO HEMOSTASIS WHEN CONTROL OF BLEEDING BY LIGATURE OR CONVENTIONALPROCEDURES IS INEFFECTIVE OR IMPRACTICAL.: Brand: FLOSEAL HEMOSTATIC MATRIX

## (undated) DEVICE — TUBING PRSS MON L6IN PVC M FEM CONN

## (undated) DEVICE — GOWN,SIRUS,NONRNF,SETINSLV,2XL,18/CS: Brand: MEDLINE

## (undated) DEVICE — CONTAINER,SPECIMEN,3OZ,OR STRL: Brand: MEDLINE

## (undated) DEVICE — ENDOSCOPIC KIT COMPLIANCE ENDOKIT

## (undated) DEVICE — COVER,MAYO STAND,STERILE: Brand: MEDLINE

## (undated) DEVICE — DRAPE,CHEST,FENES,15X10,STERIL: Brand: MEDLINE

## (undated) DEVICE — GLIDESHEATH SLENDER ACCESS KIT: Brand: GLIDESHEATH SLENDER

## (undated) DEVICE — PROVE COVER: Brand: UNBRANDED

## (undated) DEVICE — TUBING IRRIG L77IN DIA0.241IN L BOR FOR CYSTO W/ NVENT

## (undated) DEVICE — HI-TORQUE VERSACORE FLOPPY GUIDE WIRE SYSTEM 145 CM: Brand: HI-TORQUE VERSACORE

## (undated) DEVICE — TRAY CATH 16F DRN BG LTX -- CONVERT TO ITEM 363158

## (undated) DEVICE — GOWN,SIRUS,NONRNF,SETINSLV,XL,20/CS: Brand: MEDLINE

## (undated) DEVICE — BASIN EMSIS 16OZ GRAPHITE PLAS KID SHP MOLD GRAD FOR ORAL

## (undated) DEVICE — PACK PROCEDURE SURG HRT CATH

## (undated) DEVICE — BLADE SURG NO10 C STL REUSE HNDL CONVENTIONAL DISP RIB BK

## (undated) DEVICE — SET ADMIN 16ML TBNG L100IN 2 Y INJ SITE IV PIGGY BK DISP

## (undated) DEVICE — RADIFOCUS OPTITORQUE ANGIOGRAPHIC CATHETER: Brand: OPTITORQUE

## (undated) DEVICE — ROCKER SWITCH PENCIL BLADE ELECTRODE, HOLSTER: Brand: EDGE

## (undated) DEVICE — Z CONVERTED USE 2107985 COVER FLROSCP W36XL28IN 4 SIDE ADH

## (undated) DEVICE — DRAIN SURG 10FR L1/8IN DIA3.2MM SIL CHN RND HUBLESS FULL

## (undated) DEVICE — INFECTION CONTROL KIT SYS

## (undated) DEVICE — BIPOLAR FORCEPS CORD: Brand: VALLEYLAB

## (undated) DEVICE — (D)PREP SKN CHLRAPRP APPL 26ML -- CONVERT TO ITEM 371833

## (undated) DEVICE — SUTURE STRATAFIX SPRL MCRYL + SZ 2-0 L18IN ABSRB UD CT-1 SXMP1B413

## (undated) DEVICE — ANGIOGRAPHIC CATHETER: Brand: EXPO™

## (undated) DEVICE — SOLUTION IRRIG 3000ML 0.9% SOD CHL FLX CONT 0797208] ICU MEDICAL INC]

## (undated) DEVICE — GUIDEWIRE ANGIO L150CM OD0.035IN STR FIX PTFE SLD SUPP

## (undated) DEVICE — SUT ETHLN 3-0 18IN PS2 BLK --

## (undated) DEVICE — SOLUTION IRRIG 3000ML LAC R FLX CONT

## (undated) DEVICE — SHOULDER SUSPENSION KIT 6 PER BOX

## (undated) DEVICE — TOWEL SURG W17XL27IN STD BLU COT NONFENESTRATED PREWASHED

## (undated) DEVICE — SUPER TURBOVAC 90 INTEGRATED CABLE WAND ICW: Brand: COBLATION

## (undated) DEVICE — SYSTEM REPROC CBL 3 LD DISPOSABLE

## (undated) DEVICE — KENDALL DL ECG CABLE AND LEAD WIRE SYSTEM, 3-LEAD, SINGLE PATIENT USE: Brand: KENDALL

## (undated) DEVICE — ELECTRODE,RADIOTRANSLUCENT,FOAM,5PK: Brand: MEDLINE

## (undated) DEVICE — CATHETER ANGIO JR4 AD 5 FRX100 CM 25 CM PERFORMA

## (undated) DEVICE — BURR ACROMIONIZER 4.0 LG SUCT WDO DSPL: Brand: DYONICS / INCISOR

## (undated) DEVICE — 4-PORT MANIFOLD: Brand: NEPTUNE 2

## (undated) DEVICE — DRSG PATCH ANTIMIC 1INX4.0MM -- CONVERT TO ITEM 356053

## (undated) DEVICE — HANDLE LT SNAP ON ULT DURABLE LENS FOR TRUMPF ALC DISPOSABLE

## (undated) DEVICE — BLADE ES L4IN INSUL EDGE

## (undated) DEVICE — KENDALL SCD EXPRESS SLEEVES, KNEE LENGTH, MEDIUM: Brand: KENDALL SCD

## (undated) DEVICE — SYR 10ML LUER LOK 1/5ML GRAD --

## (undated) DEVICE — SOLUTION IRRIG 1000ML H2O STRL BLT

## (undated) DEVICE — KIT ACCS INTRO 4FR L10CM NDL 21GA L7CM GWIRE L40CM

## (undated) DEVICE — NEEDLE SUT PASS FOR ROT CUF LABRAL REP MULTFI SCORPION

## (undated) DEVICE — SUTURE FIBERWIRE SZ 2 L38IN NONABSORBABLE BLU WHT BLK AR7201

## (undated) DEVICE — STERILE POLYISOPRENE POWDER-FREE SURGICAL GLOVES WITH EMOLLIENT COATING: Brand: PROTEXIS

## (undated) DEVICE — COVER,TABLE,60X90,STERILE: Brand: MEDLINE

## (undated) DEVICE — SOLUTION LACTATED RINGERS INJECTION USP

## (undated) DEVICE — SPONGE GZ W4XL4IN COT 12 PLY TYP VII WVN C FLD DSGN

## (undated) DEVICE — TIP SUCT TRNSPAR RIB SURF STD BLB RIG NVENT W/ 5IN1 CONN DYND50138] MEDLINE INDUSTRIES INC]

## (undated) DEVICE — DRAPE,LAP,CHOLE,W/TROUGHS,STERILE: Brand: MEDLINE

## (undated) DEVICE — SUTURE VCRL SZ 1 L18IN ABSRB VLT CT-1 L36MM 1/2 CIR J741D

## (undated) DEVICE — 3M™ TEGADERM™ TRANSPARENT FILM DRESSING FRAME STYLE, 1626W, 4 IN X 4-3/4 IN (10 CM X 12 CM), 50/CT 4CT/CASE: Brand: 3M™ TEGADERM™

## (undated) DEVICE — DRAPE,REIN 53X77,STERILE: Brand: MEDLINE

## (undated) DEVICE — MEDI-VAC NON-CONDUCTIVE SUCTION TUBING: Brand: CARDINAL HEALTH

## (undated) DEVICE — SYRINGE ANGIO 10 CC BRL STD PRNT POLYCARB LT BLU MEDALLION

## (undated) DEVICE — SINGLE-USE BIOPSY FORCEPS: Brand: RADIAL JAW 4

## (undated) DEVICE — SUTURE V-LOC 180 SZ 0 L12IN ABSRB GRN L37MM GS-21 1/2 CIR VLOCL0316

## (undated) DEVICE — CATHETER DIAG 5FR L100CM SPEC 3 DRC CRV SZ DBL BRAID WIRE

## (undated) DEVICE — SUTURE VCRL SZ 2-0 L18IN ABSRB UD CT-1 L36MM 1/2 CIR J839D

## (undated) DEVICE — BLUNT DISSECTOR: Brand: ENDO PEANUT

## (undated) DEVICE — SOLIDIFIER MEDC 1200ML -- CONVERT TO 356117

## (undated) DEVICE — 3M™ STERI-DRAPE™ INSTRUMENT POUCH 1018: Brand: STERI-DRAPE™

## (undated) DEVICE — CATHETER ANGIO JL3.5 AD 5 FRX100 CM PERFORMA

## (undated) DEVICE — 3M™ IOBAN™ 2 ANTIMICROBIAL INCISE DRAPE 6650EZ: Brand: IOBAN™ 2

## (undated) DEVICE — BAND COMPR L24CM REG CLR PLAS HEMSTAT EXT HK AND LOOP RETEN

## (undated) DEVICE — SUT PROL 6-0 18IN RB2 DA BLU --

## (undated) DEVICE — CONTINU-FLO SOLUTION SET, 2 INJECTION SITES, MALE LUER LOCK ADAPTER WITH RETRACTABLE COLLAR, LARGE BORE STOPCOCK WITH ROTATING MALE LUER LOCK EXTENSION SET, 2 INJECTION SITES, MALE LUER LOCK ADAPTER WITH RETRACTABLE COLLAR: Brand: INTERLINK/CONTINU-FLO

## (undated) DEVICE — SCINTILLANT DUAL STRAIGHT TIP SURGICAL LIGHT

## (undated) DEVICE — BONE WAX WHITE: Brand: BONE WAX WHITE

## (undated) DEVICE — ADHESIVE SKIN CLOSURE 4X22 CM PREMIERPRO EXOFINFUSION DISP

## (undated) DEVICE — BONE MARROW KIT ASPIR 11 GA

## (undated) DEVICE — DRAPE,LAPAROTOMY,PCH,STERILE: Brand: MEDLINE

## (undated) DEVICE — SLIM BODY SKIN STAPLER: Brand: APPOSE ULC

## (undated) DEVICE — IV START KIT: Brand: MEDLINE

## (undated) DEVICE — KIT JACK TBL PT CARE

## (undated) DEVICE — GUIDEWIRE VASC L260CM 0.035IN J TIP L3MM PTFE FIX COR NAMIC

## (undated) DEVICE — CANNULA ARTHSCP L4CM DIA8MM PASSPRT BTTN

## (undated) DEVICE — BNDG ADHESIVE FABRIC 2X3.5IN -- CONVERT TO ITEM 357955

## (undated) DEVICE — 3000CC GUARDIAN II: Brand: GUARDIAN

## (undated) DEVICE — TOOL 14MH30 LEGEND 14CM 3MM: Brand: MIDAS REX ™

## (undated) DEVICE — 1200 GUARD II KIT W/5MM TUBE W/O VAC TUBE: Brand: GUARDIAN

## (undated) DEVICE — LAMINECTOMY RICHMOND-LF: Brand: MEDLINE INDUSTRIES, INC.

## (undated) DEVICE — SOLUTION IV 1000ML 0.9% SOD CHL

## (undated) DEVICE — CUFF BLD PRSS AD CLTH SGL TB W/ BAYNT CONN ROUNDED CORNER